# Patient Record
Sex: FEMALE | Race: WHITE | Employment: OTHER | URBAN - METROPOLITAN AREA
[De-identification: names, ages, dates, MRNs, and addresses within clinical notes are randomized per-mention and may not be internally consistent; named-entity substitution may affect disease eponyms.]

---

## 2017-02-10 PROBLEM — M79.605 LOW BACK PAIN RADIATING TO LEFT LEG: Status: ACTIVE | Noted: 2017-02-10

## 2017-02-10 PROBLEM — M54.50 LOW BACK PAIN RADIATING TO LEFT LEG: Status: ACTIVE | Noted: 2017-02-10

## 2017-02-10 PROBLEM — M54.16 RADICULOPATHY, LUMBAR REGION: Status: ACTIVE | Noted: 2017-02-10

## 2017-04-07 PROBLEM — M48.061 SPINAL STENOSIS, LUMBAR REGION, WITHOUT NEUROGENIC CLAUDICATION: Status: ACTIVE | Noted: 2017-04-07

## 2017-04-07 PROBLEM — M51.36 BULGING LUMBAR DISC: Status: ACTIVE | Noted: 2017-04-07

## 2017-04-07 PROBLEM — M43.16 SPONDYLOLISTHESIS, LUMBAR REGION: Status: ACTIVE | Noted: 2017-04-07

## 2017-04-07 PROBLEM — M51.369 BULGING LUMBAR DISC: Status: ACTIVE | Noted: 2017-04-07

## 2018-03-15 ENCOUNTER — TELEPHONE (OUTPATIENT)
Dept: FAMILY MEDICINE CLINIC | Age: 43
End: 2018-03-15

## 2018-03-15 NOTE — TELEPHONE ENCOUNTER
Patient called in and stated that her lt arm has been numb x 3 days, with severe pain. Her numbness goes from her shoulder down to her fingertips. She has also started with finger swelling.     Patient was advised to go to ED  She gave verbal understanding

## 2018-09-12 ENCOUNTER — OFFICE VISIT (OUTPATIENT)
Dept: FAMILY MEDICINE CLINIC | Age: 43
End: 2018-09-12
Payer: MEDICARE

## 2018-09-12 VITALS
RESPIRATION RATE: 16 BRPM | DIASTOLIC BLOOD PRESSURE: 86 MMHG | BODY MASS INDEX: 31.5 KG/M2 | HEIGHT: 71 IN | HEART RATE: 92 BPM | WEIGHT: 225 LBS | SYSTOLIC BLOOD PRESSURE: 133 MMHG | OXYGEN SATURATION: 96 %

## 2018-09-12 DIAGNOSIS — R10.32 PAIN, ABDOMINAL, LLQ: ICD-10-CM

## 2018-09-12 DIAGNOSIS — F31.9 BIPOLAR AFFECTIVE DISORDER, REMISSION STATUS UNSPECIFIED (HCC): ICD-10-CM

## 2018-09-12 DIAGNOSIS — Z00.00 ANNUAL PHYSICAL EXAM: Primary | ICD-10-CM

## 2018-09-12 DIAGNOSIS — E11.9 CONTROLLED TYPE 2 DIABETES MELLITUS WITHOUT COMPLICATION, WITHOUT LONG-TERM CURRENT USE OF INSULIN (HCC): ICD-10-CM

## 2018-09-12 DIAGNOSIS — Z23 NEED FOR PROPHYLACTIC VACCINATION AGAINST STREPTOCOCCUS PNEUMONIAE (PNEUMOCOCCUS): ICD-10-CM

## 2018-09-12 PROCEDURE — G0009 ADMIN PNEUMOCOCCAL VACCINE: HCPCS | Performed by: FAMILY MEDICINE

## 2018-09-12 PROCEDURE — G0439 PPPS, SUBSEQ VISIT: HCPCS | Performed by: FAMILY MEDICINE

## 2018-09-12 PROCEDURE — 90732 PPSV23 VACC 2 YRS+ SUBQ/IM: CPT | Performed by: FAMILY MEDICINE

## 2018-09-12 RX ORDER — AMOXICILLIN AND CLAVULANATE POTASSIUM 875; 125 MG/1; MG/1
1 TABLET, FILM COATED ORAL 2 TIMES DAILY WITH MEALS
Qty: 20 TABLET | Refills: 0 | Status: SHIPPED | OUTPATIENT
Start: 2018-09-12 | End: 2018-09-22

## 2018-09-12 RX ORDER — ATORVASTATIN CALCIUM 20 MG/1
TABLET, FILM COATED ORAL
Qty: 90 TABLET | Refills: 3 | Status: SHIPPED | OUTPATIENT
Start: 2018-09-12 | End: 2019-03-11

## 2018-09-12 NOTE — PROGRESS NOTES
abdominal pain. Genitourinary: Negative for dysuria and frequency. Musculoskeletal: Negative for back pain, joint pain and myalgias. Skin: Negative for rash. Neurological: Negative for dizziness, seizures and headaches. Endo/Heme/Allergies: Does not bruise/bleed easily. Psychiatric/Behavioral: Negative for depression. The patient is not nervous/anxious. Past Medical History:   Diagnosis Date    Ankle fracture, right 2013    hardware insertion; fracture after a fall from 40 feet     Bipolar affective disorder ([facility] [address]     [facility] Cervical radiculopathy     De Quervain's tenosynovitis, left     Diabetic neuropathy ([address]     Displacement of cervical intervertebral disc without myelopathy     Displacement of lumbar intervertebral disc without myelopathy     Headache(784.0)     Liver cyst     Long term (current) use of opiate analgesic     [facility]    Lumbar radiculopathy     Plantar fasciitis     Dr Larry Church Pulmonary nodules     Cleared by Dr Carlitos Barr; stable nodules    Thyroid cyst     biopsied in 2011, but report was unsatisfactory     Type II or unspecified type diabetes mellitus without mention of complication, not stated as uncontrolled      Past Surgical History:   Procedure Laterality Date    ANKLE FRACTURE SURGERY Right 8/2013    [facility]  10/16/2017    PLIF L4-5 L5-s1    CHOLECYSTECTOMY      COLONOSCOPY  7/2010    Dr Reina Gunter; normal except for hemorrhoids    [address], 2004, 2005,2013    podaitrist; panatar fasciits, heel spuirs, tendon lengthening.      TONSILLECTOMY AND ADENOIDECTOMY      TUBAL LIGATION             PE:  VS:  /86   Pulse 92   Resp 16   Ht 5' 11\" (1.803 m)   Wt 225 lb (102.1 kg)   LMP 09/07/2018 (Exact Date)   SpO2 96%   BMI 31.38 kg/m²   Physical Exam  General: Well nourished, well developed, no acute distress  Eyes:  PERRL   Conjunctiva Hemoglobin A1C; Future  -     Microalbumin / Creatinine Urine Ratio; Future    Bipolar affective disorder, remission status unspecified ([facility] [address]        Plan:     Age. Health maintenance issues are reviewed. Risks and benefits of breast cancer and cervical cancer screening discussed. She does not wish to pursue. She does consent to a 23 valent pneumococcal vaccination. Other age-appropriate health maintenance issues reviewed  Diabetes appears stable but I do need routine lab work. Ordered as above. Bipolar issues are stable, continue to work with psychiatry. The left lower quadrant abdominal pain differential is discussed. This most likely represents diverticulitis versus infectious colitis. Antibiotics ordered as above. Imaging also ordered. Follow-up as below but sooner if issues worsen. Counseled regarding above diagnosis, including possible risks and complications,  especially if left uncontrolled. Counseled regarding the possible side effects, risks, benefits and alternatives to treatment; patient and/or guardian verbalizes understanding, agrees, feels comfortable with and wishes to proceed with above treatment plan. Call or go to ED immediately if symptoms worsen or persist. Advised patient to call with any new medication issues, and, as applicable, read all Rx info from pharmacy to assure aware of all possible risks and side effects of medication before taking. As applicable, educational materials and/or home exercises printed for patient's review and were included in patient instructions on his/her After Visit Summary and given to patient at the end of visit. Patient and/or guardian given opportunity to ask questions/raise concerns. She verbalized comfort and understanding of instructions.             Follow Up:     Return in about 1 year (around 9/12/2019), or if symptoms worsen or fail to improve within 2 weeks , for annual physical. or sooner if the above issues change unexpectedly or new issues develop     This document was prepared at least partially through the use of voice recognition software. Although effort is taken to assure the accuracy of this document, it is possible that grammatical, syntax,  or spelling errors may occur.       Electronically signed by Opal Kuhn MD [facility]

## 2018-09-15 ASSESSMENT — ENCOUNTER SYMPTOMS
SHORTNESS OF BREATH: 0
WHEEZING: 0
BACK PAIN: 0
BLURRED VISION: 0
ABDOMINAL PAIN: 1
COUGH: 0
SORE THROAT: 0

## 2018-10-01 LAB
ALBUMIN SERPL-MCNC: NORMAL G/DL
ALP BLD-CCNC: NORMAL U/L
ALT SERPL-CCNC: NORMAL U/L
ANION GAP SERPL CALCULATED.3IONS-SCNC: NORMAL MMOL/L
AST SERPL-CCNC: NORMAL U/L
AVERAGE GLUCOSE: NORMAL
BASOPHILS ABSOLUTE: NORMAL /ΜL
BASOPHILS RELATIVE PERCENT: NORMAL %
BILIRUB SERPL-MCNC: NORMAL MG/DL (ref 0.1–1.4)
BUN BLDV-MCNC: NORMAL MG/DL
CALCIUM SERPL-MCNC: NORMAL MG/DL
CHLORIDE BLD-SCNC: NORMAL MMOL/L
CHOLESTEROL, TOTAL: 177 MG/DL
CHOLESTEROL/HDL RATIO: 1.4
CO2: NORMAL MMOL/L
CREAT SERPL-MCNC: 1.1 MG/DL
CREATININE, URINE: NORMAL
EOSINOPHILS ABSOLUTE: NORMAL /ΜL
EOSINOPHILS RELATIVE PERCENT: NORMAL %
GFR CALCULATED: NORMAL
GLUCOSE BLD-MCNC: NORMAL MG/DL
HBA1C MFR BLD: 6.1 %
HCT VFR BLD CALC: NORMAL % (ref 36–46)
HDLC SERPL-MCNC: 69 MG/DL (ref 35–70)
HEMOGLOBIN: NORMAL G/DL (ref 12–16)
LACTIC ACID: 1.6
LDL CHOLESTEROL CALCULATED: 98 MG/DL (ref 0–160)
LYMPHOCYTES ABSOLUTE: NORMAL /ΜL
LYMPHOCYTES RELATIVE PERCENT: NORMAL %
MCH RBC QN AUTO: NORMAL PG
MCHC RBC AUTO-ENTMCNC: NORMAL G/DL
MCV RBC AUTO: NORMAL FL
MICROALBUMIN/CREAT 24H UR: NORMAL MG/G{CREAT}
MICROALBUMIN/CREAT UR-RTO: NORMAL
MONOCYTES ABSOLUTE: NORMAL /ΜL
MONOCYTES RELATIVE PERCENT: NORMAL %
NEUTROPHILS ABSOLUTE: NORMAL /ΜL
NEUTROPHILS RELATIVE PERCENT: NORMAL %
PDW BLD-RTO: NORMAL %
PLATELET # BLD: NORMAL K/ΜL
PMV BLD AUTO: NORMAL FL
POTASSIUM SERPL-SCNC: 4.4 MMOL/L
RBC # BLD: NORMAL 10^6/ΜL
SODIUM BLD-SCNC: NORMAL MMOL/L
TOTAL PROTEIN: NORMAL
TRIGL SERPL-MCNC: 83 MG/DL
VLDLC SERPL CALC-MCNC: NORMAL MG/DL
WBC # BLD: NORMAL 10^3/ML

## 2018-10-05 DIAGNOSIS — R10.32 PAIN, ABDOMINAL, LLQ: ICD-10-CM

## 2018-10-05 DIAGNOSIS — E11.9 CONTROLLED TYPE 2 DIABETES MELLITUS WITHOUT COMPLICATION, WITHOUT LONG-TERM CURRENT USE OF INSULIN (HCC): ICD-10-CM

## 2018-10-05 DIAGNOSIS — Z00.00 ANNUAL PHYSICAL EXAM: ICD-10-CM

## 2019-02-01 ENCOUNTER — OFFICE VISIT (OUTPATIENT)
Dept: FAMILY MEDICINE CLINIC | Age: 44
End: 2019-02-01
Payer: MEDICARE

## 2019-02-01 VITALS
HEIGHT: 71 IN | WEIGHT: 229 LBS | BODY MASS INDEX: 32.06 KG/M2 | SYSTOLIC BLOOD PRESSURE: 127 MMHG | DIASTOLIC BLOOD PRESSURE: 71 MMHG | RESPIRATION RATE: 16 BRPM | HEART RATE: 82 BPM

## 2019-02-01 DIAGNOSIS — R20.0 NUMBNESS AND TINGLING OF BOTH UPPER EXTREMITIES: Primary | ICD-10-CM

## 2019-02-01 DIAGNOSIS — R20.2 NUMBNESS AND TINGLING OF BOTH UPPER EXTREMITIES: Primary | ICD-10-CM

## 2019-02-01 PROCEDURE — G8427 DOCREV CUR MEDS BY ELIG CLIN: HCPCS | Performed by: STUDENT IN AN ORGANIZED HEALTH CARE EDUCATION/TRAINING PROGRAM

## 2019-02-01 PROCEDURE — 99213 OFFICE O/P EST LOW 20 MIN: CPT | Performed by: STUDENT IN AN ORGANIZED HEALTH CARE EDUCATION/TRAINING PROGRAM

## 2019-02-01 PROCEDURE — G8484 FLU IMMUNIZE NO ADMIN: HCPCS | Performed by: STUDENT IN AN ORGANIZED HEALTH CARE EDUCATION/TRAINING PROGRAM

## 2019-02-01 PROCEDURE — G8417 CALC BMI ABV UP PARAM F/U: HCPCS | Performed by: STUDENT IN AN ORGANIZED HEALTH CARE EDUCATION/TRAINING PROGRAM

## 2019-02-01 PROCEDURE — 4004F PT TOBACCO SCREEN RCVD TLK: CPT | Performed by: STUDENT IN AN ORGANIZED HEALTH CARE EDUCATION/TRAINING PROGRAM

## 2019-02-01 RX ORDER — MIRABEGRON 50 MG/1
TABLET, FILM COATED, EXTENDED RELEASE ORAL
Refills: 12 | COMMUNITY
Start: 2019-01-21 | End: 2020-06-16

## 2019-02-01 ASSESSMENT — ENCOUNTER SYMPTOMS
COUGH: 0
SORE THROAT: 1
SHORTNESS OF BREATH: 0
NAUSEA: 0
SINUS PAIN: 0
RHINORRHEA: 0
DIARRHEA: 0
CONSTIPATION: 0
VOMITING: 0
BACK PAIN: 1

## 2019-02-01 ASSESSMENT — PATIENT HEALTH QUESTIONNAIRE - PHQ9
2. FEELING DOWN, DEPRESSED OR HOPELESS: 0
SUM OF ALL RESPONSES TO PHQ QUESTIONS 1-9: 0
1. LITTLE INTEREST OR PLEASURE IN DOING THINGS: 0
SUM OF ALL RESPONSES TO PHQ9 QUESTIONS 1 & 2: 0
SUM OF ALL RESPONSES TO PHQ QUESTIONS 1-9: 0

## 2019-02-20 DIAGNOSIS — R20.2 NUMBNESS AND TINGLING OF BOTH UPPER EXTREMITIES: ICD-10-CM

## 2019-02-20 DIAGNOSIS — R20.0 NUMBNESS AND TINGLING OF BOTH UPPER EXTREMITIES: ICD-10-CM

## 2019-04-03 ENCOUNTER — OFFICE VISIT (OUTPATIENT)
Dept: FAMILY MEDICINE CLINIC | Age: 44
End: 2019-04-03
Payer: MEDICARE

## 2019-04-03 VITALS
WEIGHT: 227 LBS | DIASTOLIC BLOOD PRESSURE: 83 MMHG | SYSTOLIC BLOOD PRESSURE: 127 MMHG | OXYGEN SATURATION: 95 % | RESPIRATION RATE: 16 BRPM | BODY MASS INDEX: 31.78 KG/M2 | HEIGHT: 71 IN | HEART RATE: 90 BPM

## 2019-04-03 DIAGNOSIS — M54.12 CERVICAL RADICULOPATHY: Primary | ICD-10-CM

## 2019-04-03 DIAGNOSIS — J30.2 SEASONAL ALLERGIES: ICD-10-CM

## 2019-04-03 DIAGNOSIS — H54.7 VISION PROBLEM: ICD-10-CM

## 2019-04-03 DIAGNOSIS — K52.9 COLITIS: ICD-10-CM

## 2019-04-03 DIAGNOSIS — F31.9 BIPOLAR AFFECTIVE DISORDER, REMISSION STATUS UNSPECIFIED (HCC): ICD-10-CM

## 2019-04-03 DIAGNOSIS — G56.10 MEDIAN NERVE NEUROPATHY, UNSPECIFIED LATERALITY: ICD-10-CM

## 2019-04-03 PROCEDURE — G8427 DOCREV CUR MEDS BY ELIG CLIN: HCPCS | Performed by: FAMILY MEDICINE

## 2019-04-03 PROCEDURE — G8417 CALC BMI ABV UP PARAM F/U: HCPCS | Performed by: FAMILY MEDICINE

## 2019-04-03 PROCEDURE — 99214 OFFICE O/P EST MOD 30 MIN: CPT | Performed by: FAMILY MEDICINE

## 2019-04-03 PROCEDURE — 4004F PT TOBACCO SCREEN RCVD TLK: CPT | Performed by: FAMILY MEDICINE

## 2019-04-03 RX ORDER — CYCLOBENZAPRINE HCL 5 MG
5 TABLET ORAL 3 TIMES DAILY PRN
Qty: 30 TABLET | Refills: 1 | Status: SHIPPED | OUTPATIENT
Start: 2019-04-03 | End: 2019-04-13

## 2019-04-03 RX ORDER — CETIRIZINE HYDROCHLORIDE 10 MG/1
10 TABLET ORAL DAILY
Qty: 30 TABLET | Refills: 11 | Status: SHIPPED
Start: 2019-04-03 | End: 2022-10-12 | Stop reason: SDUPTHER

## 2019-04-03 RX ORDER — MELOXICAM 7.5 MG/1
7.5 TABLET ORAL DAILY PRN
Qty: 30 TABLET | Refills: 5 | Status: SHIPPED | OUTPATIENT
Start: 2019-04-03 | End: 2019-10-17 | Stop reason: SDUPTHER

## 2019-04-03 NOTE — PROGRESS NOTES
CC   Chief Complaint   Patient presents with    Carpal Tunnel     HPI:   Patient comes in today for the below issue(s). Recheck of neuropathy concern  EMG's reviewed - med neuropathy and other neuropathy noted  Xray reviewed- C5-6 DDD noted  Has ongoing upper shoulder and neck pain  ROM activities are variable for aggravating her pain; ROM does get limited by pain  Hand issues are better  NSAID (ibuprofen) tried with incomplete relief    Hospital follow up  She was hospitalized for a few days at [facility] with abdominal pain and diarrhea had blood in it  States she's diagnosed with nonspecific colitis. Was told she needs to follow-up for an outpatient endoscopy but has not yet done so. States since coming home, issues have improved. She's no longer seeing blood in the stool but continues to struggle with stools  Denies abdominal pain, cramping, change in diet, no ill contacts. Had traveled within the past year overseas. But nothing recent. Review of Systems  Review of Systems   Constitutional: Positive for fatigue. Negative for chills, diaphoresis and fever. HENT: Positive for congestion and postnasal drip. Negative for trouble swallowing. Eyes: Positive for visual disturbance (Visual acuity has decreased over the last few months). Respiratory: Negative for shortness of breath. Cardiovascular: Negative for chest pain and palpitations. Gastrointestinal: Negative for abdominal pain. Genitourinary: Negative for difficulty urinating (Greatly improved after her sling). Musculoskeletal: Negative for arthralgias and back pain. Psychiatric/Behavioral: Negative for behavioral problems, confusion, decreased concentration and dysphoric mood. The patient is not hyperactive.            Past Medical History:   Diagnosis Date    Ankle fracture, right 2013    hardware insertion; fracture after a fall from 40 feet     Bipolar affective disorder ([facility] [address]     [facility] Cervical radiculopathy     De Quervain's tenosynovitis, left     Diabetic neuropathy (HCC)     Displacement of cervical intervertebral disc without myelopathy     Displacement of lumbar intervertebral disc without myelopathy     Headache(784.0)     Liver cyst     Long term (current) use of opiate analgesic     [facility]    Lumbar radiculopathy     Plantar fasciitis     Dr Man[facility] Pulmonary nodules     Cleared by Dr Ai Deleon; stable nodules    Thyroid cyst     biopsied in 2011, but report was unsatisfactory     Type II or unspecified type diabetes mellitus without mention of complication, not stated as uncontrolled          PE:  VS:  /83   Pulse 90   Resp 16   Ht 5' 11\" (1.803 m)   Wt 227 lb (103 kg)   LMP 02/01/2019 (Approximate)   SpO2 95%   BMI 31.66 kg/m²   Physical Exam  General: Well nourished, well developed, no acute distress  Eyes:  PERRL   Conjunctiva unremarkable   ENT:  TM's intact bilaterally, no effusion   Nasal:  +mucosal edema     No nasal drainage   Oral:  mucosa moist and pink             +posterior pharyngeal drainage     no posterior pharyngeal exudate  Neck:  No midline tenderness.  No deformity or step-off   Decreased lateral rotation and flexion   No palpable cervical lymphoadenopathy   Thyroid without mass or enlargement  Resp: Lungs CTAB   Equal and adequate air exchange without accessory muscle use   No rales, rhonchi or wheeze  CV: S1S2 RRR   No murmur   Intact distal pulses   No edema  GI: Abdomen Soft, non tender, non distended   Normoactive bowel sounds   No palpable hepatosplenomegaly  MS: Physiologic ROM of all extremities    Negative Tinel, weakly positive Phalen on the left   Intact distal pulses   No clubbing or cyanosis   Skin Warm and dry; no rash or lesion  Neuro: Alert and oriented; normal and intact dtr's   Psych: Euthymic mood, congruent affect       Assessment (including specific orders/meds/labs):      Destiny was seen today for carpal tunnel. Diagnoses and all orders for this visit:    Cervical radiculopathy  -     MRI Cervical Spine WO Contrast; Future  -     meloxicam (MOBIC) 7.5 MG tablet; Take 1 tablet by mouth daily as needed for Pain  -     cyclobenzaprine (FLEXERIL) 5 MG tablet; Take 1 tablet by mouth 3 times daily as needed for Muscle spasms    Median nerve neuropathy, unspecified laterality    Colitis  -     External Referral To Gastroenterology    Vision problem  -     External Referral To Optometry    Seasonal allergies  -     cetirizine (ZYRTEC) 10 MG tablet; Take 1 tablet by mouth daily        Plan:         Counseled regarding above diagnosis, including possible risks and complications,  especially if left uncontrolled. Counseled regarding the possible sideeffects, risks, benefits and alternatives to treatment; patient and/or guardian verbalizes understanding, agrees, feels comfortable with and wishes to proceed with above treatment plan. Check MRI to look for cervical disc pathology. She is hesitant to consider seeing PMR or hand surgeon for the median neuropathy. She rejects the idea of this all being carpal tunnel. Does agree to reassess after the MRI. Trial of the NSAID and muscle relaxer as above  Other chronic issues are as above. I have referred her to GI for evaluation of the colitis. Her exam is benign. Symptoms have improved. However she should have a colonoscopy      Call or go to ED immediately if symptoms worsen or persist. Advised patient to call with any new medication issues, and, as applicable, read all Rx info from pharmacy to assure aware ofall possible risks and side effects of medication before taking. As applicable, educational materials and/or home exercises printed forpatient's review and were included in patient instructions on his/her After Visit Summary and given to patient at the end of visit. Patient and/or guardian given opportunity to ask questions/raise concerns.   She verbalized comfort and understanding of instructions. Follow Up:     Return for 1-2 weeks after seeing specialists. or sooner if the above issues change unexpectedly or new issues develop     This document was prepared at least partially through the use ofTalkyLandice recognition software. Although effort is taken to assure the accuracy of this document, it is possible that grammatical, syntax,  or spelling errors may occur.       Electronically signed by Ajay Nayak MD [facility]

## 2019-04-06 ASSESSMENT — ENCOUNTER SYMPTOMS
TROUBLE SWALLOWING: 0
SHORTNESS OF BREATH: 0
ABDOMINAL PAIN: 0
BACK PAIN: 0

## 2019-05-10 ENCOUNTER — TELEPHONE (OUTPATIENT)
Dept: FAMILY MEDICINE CLINIC | Age: 44
End: 2019-05-10

## 2019-05-10 NOTE — TELEPHONE ENCOUNTER
Patient called stating that she is having a pleurisy flare up. Has tried aleve, tylenol and ibuprofen with no help. Would like something called in. I did inform her with her calling so late in the day that if we do not get back to her with an answer to present to a walkin clinic or the ER. Patient understood  Please advise.

## 2019-05-31 ENCOUNTER — TELEPHONE (OUTPATIENT)
Dept: FAMILY MEDICINE CLINIC | Age: 44
End: 2019-05-31

## 2019-05-31 DIAGNOSIS — M50.222 HERNIATION OF INTERVERTEBRAL DISC AT C5-C6 LEVEL: Primary | ICD-10-CM

## 2019-05-31 DIAGNOSIS — E04.1 THYROID NODULE: ICD-10-CM

## 2019-05-31 NOTE — TELEPHONE ENCOUNTER
Mri reviewed  Has a bulging disc C5-6. Has contact with spinal cord, so this is probably contributing to ehr symptoms. I recommend seeing neurosurgeon or ortho spine surgeon. Can refer if agrees    Incidentally looked like thryoid was enlarged and/or a nodule but this was not the best test to assess so cannot tell. Would want to get a thyroid u/s. Will order if agrees.

## 2019-05-31 NOTE — TELEPHONE ENCOUNTER
Patient is agreeable to the surgeon and the u/s. She would like to have the u/s done at [facility].  She does not wish to see the same surgeon that did her back.

## 2019-06-03 NOTE — TELEPHONE ENCOUNTER
Patient notified referrals placed and should be contacted regarding both appts. States understanding.

## 2019-06-06 ENCOUNTER — TELEPHONE (OUTPATIENT)
Dept: FAMILY MEDICINE CLINIC | Age: 44
End: 2019-06-06

## 2019-06-06 DIAGNOSIS — E04.1 THYROID NODULE: Primary | ICD-10-CM

## 2019-06-07 NOTE — TELEPHONE ENCOUNTER
Patient notified, she will call and schedule.   Endocrinology  Tonny Hawthorne DO  Phone: [phone]  [address] [place] [id number]

## 2019-07-11 ENCOUNTER — TELEPHONE (OUTPATIENT)
Dept: FAMILY MEDICINE CLINIC | Age: 44
End: 2019-07-11

## 2019-07-11 DIAGNOSIS — M50.222 HERNIATION OF INTERVERTEBRAL DISC AT C5-C6 LEVEL: Primary | ICD-10-CM

## 2020-01-27 ENCOUNTER — TELEPHONE (OUTPATIENT)
Dept: FAMILY MEDICINE CLINIC | Age: 45
End: 2020-01-27

## 2020-01-27 RX ORDER — MELOXICAM 7.5 MG/1
7.5 TABLET ORAL DAILY PRN
Qty: 30 TABLET | Refills: 3 | Status: SHIPPED
Start: 2020-01-27 | End: 2020-02-10

## 2020-01-27 RX ORDER — PERMETHRIN 50 MG/G
CREAM TOPICAL
Qty: 1 TUBE | Refills: 0 | Status: SHIPPED
Start: 2020-01-27 | End: 2020-06-16

## 2020-01-27 NOTE — TELEPHONE ENCOUNTER
I can send in a course of Elimite but need pharmacy down there    Would refer her to [facility] site for information on prevention and control of spread of lice.

## 2020-01-27 NOTE — TELEPHONE ENCOUNTER
Patient notified and verbalizes understanding. The pharmacy is now in her chart. She needs it to go to the [place] in [facility].

## 2020-02-10 RX ORDER — MELOXICAM 7.5 MG/1
7.5 TABLET ORAL DAILY PRN
Qty: 90 TABLET | Refills: 0 | Status: SHIPPED
Start: 2020-02-10 | End: 2020-06-16 | Stop reason: SDUPTHER

## 2020-06-16 ENCOUNTER — HOSPITAL ENCOUNTER (OUTPATIENT)
Age: 45
Discharge: HOME | End: 2020-06-18
Payer: MEDICARE

## 2020-06-16 ENCOUNTER — OFFICE VISIT (OUTPATIENT)
Dept: FAMILY MEDICINE CLINIC | Age: 45
End: 2020-06-16
Payer: MEDICARE

## 2020-06-16 VITALS
BODY MASS INDEX: 28 KG/M2 | WEIGHT: 200 LBS | SYSTOLIC BLOOD PRESSURE: 124 MMHG | TEMPERATURE: 97 F | OXYGEN SATURATION: 97 % | DIASTOLIC BLOOD PRESSURE: 82 MMHG | RESPIRATION RATE: 16 BRPM | HEIGHT: 71 IN | HEART RATE: 76 BPM

## 2020-06-16 LAB
ALBUMIN SERPL-MCNC: 4.2 G/DL (ref 3.5–5.2)
ALP BLD-CCNC: 112 U/L (ref 35–104)
ALT SERPL-CCNC: <5 U/L (ref 0–32)
ANION GAP SERPL CALCULATED.3IONS-SCNC: 15 MMOL/L (ref 7–16)
AST SERPL-CCNC: 15 U/L (ref 0–31)
BASOPHILS ABSOLUTE: 0.05 E9/L (ref 0–0.2)
BASOPHILS RELATIVE PERCENT: 0.6 % (ref 0–2)
BILIRUB SERPL-MCNC: 0.2 MG/DL (ref 0–1.2)
BUN BLDV-MCNC: 13 MG/DL (ref 6–20)
CALCIUM SERPL-MCNC: 9.6 MG/DL (ref 8.6–10.2)
CHLORIDE BLD-SCNC: 105 MMOL/L (ref 98–107)
CHOLESTEROL, TOTAL: 213 MG/DL (ref 0–199)
CO2: 22 MMOL/L (ref 22–29)
CREAT SERPL-MCNC: 1 MG/DL (ref 0.5–1)
EOSINOPHILS ABSOLUTE: 0.06 E9/L (ref 0.05–0.5)
EOSINOPHILS RELATIVE PERCENT: 0.7 % (ref 0–6)
GFR AFRICAN AMERICAN: >60
GFR NON-AFRICAN AMERICAN: 59 ML/MIN/1.73
GLUCOSE BLD-MCNC: 80 MG/DL (ref 74–99)
HBA1C MFR BLD: 5.8 %
HCT VFR BLD CALC: 42.2 % (ref 34–48)
HDLC SERPL-MCNC: 60 MG/DL
HEMOGLOBIN: 12.7 G/DL (ref 11.5–15.5)
IMMATURE GRANULOCYTES #: 0.04 E9/L
IMMATURE GRANULOCYTES %: 0.5 % (ref 0–5)
LDL CHOLESTEROL CALCULATED: 131 MG/DL (ref 0–99)
LYMPHOCYTES ABSOLUTE: 2.14 E9/L (ref 1.5–4)
LYMPHOCYTES RELATIVE PERCENT: 24.7 % (ref 20–42)
MCH RBC QN AUTO: 30.2 PG (ref 26–35)
MCHC RBC AUTO-ENTMCNC: 30.1 % (ref 32–34.5)
MCV RBC AUTO: 100.2 FL (ref 80–99.9)
MONOCYTES ABSOLUTE: 0.52 E9/L (ref 0.1–0.95)
MONOCYTES RELATIVE PERCENT: 6 % (ref 2–12)
NEUTROPHILS ABSOLUTE: 5.84 E9/L (ref 1.8–7.3)
NEUTROPHILS RELATIVE PERCENT: 67.5 % (ref 43–80)
PDW BLD-RTO: 13.4 FL (ref 11.5–15)
PLATELET # BLD: 231 E9/L (ref 130–450)
PMV BLD AUTO: 12.1 FL (ref 7–12)
POTASSIUM SERPL-SCNC: 5 MMOL/L (ref 3.5–5)
RBC # BLD: 4.21 E12/L (ref 3.5–5.5)
SODIUM BLD-SCNC: 142 MMOL/L (ref 132–146)
TOTAL PROTEIN: 7.6 G/DL (ref 6.4–8.3)
TRIGL SERPL-MCNC: 108 MG/DL (ref 0–149)
VLDLC SERPL CALC-MCNC: 22 MG/DL
WBC # BLD: 8.7 E9/L (ref 4.5–11.5)

## 2020-06-16 PROCEDURE — 99214 OFFICE O/P EST MOD 30 MIN: CPT | Performed by: FAMILY MEDICINE

## 2020-06-16 PROCEDURE — 85025 COMPLETE CBC W/AUTO DIFF WBC: CPT

## 2020-06-16 PROCEDURE — 80061 LIPID PANEL: CPT

## 2020-06-16 PROCEDURE — 83036 HEMOGLOBIN GLYCOSYLATED A1C: CPT | Performed by: FAMILY MEDICINE

## 2020-06-16 PROCEDURE — 80053 COMPREHEN METABOLIC PANEL: CPT

## 2020-06-16 PROCEDURE — G8419 CALC BMI OUT NRM PARAM NOF/U: HCPCS | Performed by: FAMILY MEDICINE

## 2020-06-16 PROCEDURE — 3044F HG A1C LEVEL LT 7.0%: CPT | Performed by: FAMILY MEDICINE

## 2020-06-16 PROCEDURE — 4004F PT TOBACCO SCREEN RCVD TLK: CPT | Performed by: FAMILY MEDICINE

## 2020-06-16 PROCEDURE — 2022F DILAT RTA XM EVC RTNOPTHY: CPT | Performed by: FAMILY MEDICINE

## 2020-06-16 PROCEDURE — G8427 DOCREV CUR MEDS BY ELIG CLIN: HCPCS | Performed by: FAMILY MEDICINE

## 2020-06-16 PROCEDURE — 36415 COLL VENOUS BLD VENIPUNCTURE: CPT | Performed by: FAMILY MEDICINE

## 2020-06-16 RX ORDER — MELOXICAM 7.5 MG/1
7.5 TABLET ORAL DAILY PRN
Qty: 90 TABLET | Refills: 1 | Status: SHIPPED
Start: 2020-06-16 | End: 2020-12-14 | Stop reason: SDUPTHER

## 2020-06-16 RX ORDER — DEXTROAMPHETAMINE SACCHARATE, AMPHETAMINE ASPARTATE, DEXTROAMPHETAMINE SULFATE AND AMPHETAMINE SULFATE 5; 5; 5; 5 MG/1; MG/1; MG/1; MG/1
1 TABLET ORAL 2 TIMES DAILY
COMMUNITY

## 2020-06-16 RX ORDER — TRAZODONE HYDROCHLORIDE 50 MG/1
1 TABLET ORAL DAILY
COMMUNITY
Start: 2020-05-22

## 2020-06-16 RX ORDER — GABAPENTIN 400 MG/1
400 CAPSULE ORAL 4 TIMES DAILY
Qty: 120 CAPSULE | Refills: 2 | Status: SHIPPED
Start: 2020-06-16 | End: 2020-09-18

## 2020-06-16 ASSESSMENT — ENCOUNTER SYMPTOMS
TROUBLE SWALLOWING: 0
DIARRHEA: 0
ABDOMINAL PAIN: 0
SHORTNESS OF BREATH: 0
CONSTIPATION: 0

## 2020-06-16 NOTE — PROGRESS NOTES
CC   Chief Complaint   Patient presents with    Wrist Pain     right    Diabetes     HPI:   Patient comes in today for the below issue(s). Thyroid nodule  Incidental finding of a 2 cm thyroid nodule on MRI of her neck  She was referred to endocrinology  Ended up having fine-needle aspiration that showed benign follicular nodule, no evidence of cancer  She is doing well. She denies swallowing difficulty or hoarseness. Cervical disc disease. Repeat MRI in  May 2019 showed bulging C5-C6 causing significant right and mild left-sided foraminal narrowing  I had recommended she see neurosurgery  She states the issues are reasonably stable and she is not inclined to pursue. She is asking if I can prescribe her gabapentin. She had use this for chronic foot problems and she noticed that it helped her cervicalgia as well. She was getting it from a podiatrist but that podiatrist is no longer practicing  OARRS reviewed, no significant risk factors identified    Right wrist pain  She continues to report progressive symptoms of pain in her right wrist  She is stating now she is better able to localize this to over the dorsal aspect of her thumb. It is worse with rotation of her wrist or bending of her thumb  She has been using a general wrist splint with out sufficient relief. Bipolar disorder  Follow-up  Remains under the care of psychiatry  States mood is stable  Doing well at the present time  Does not feel overly happy, nor does she feel inappropriately sad.   She is pleased with her progress    Impaired glucose tolerance  Follow-up  She is lost over 20 pounds since last visit  She has cut out soda pop  She is started doing yoga  She says more significant or strenuous activity causes too much pain in her feet  She denies polyuria polydipsia or polyphagia  She was supposed to be on statin but has stopped    Health maintenance  She is overdue to see GYN    Review of Systems  Review of Systems   Constitutional: motion. Cardiovascular:      Rate and Rhythm: Normal rate and regular rhythm. Pulses: Normal pulses. Heart sounds: No murmur. Pulmonary:      Effort: Pulmonary effort is normal.      Breath sounds: No wheezing, rhonchi or rales. Abdominal:      General: Abdomen is flat. Bowel sounds are normal. There is no distension. Tenderness: There is no abdominal tenderness. Genitourinary:     Comments: Defer  Musculoskeletal:      Right wrist: She exhibits decreased range of motion and tenderness (+Finklestein). Cervical back: She exhibits decreased range of motion. She exhibits no tenderness and no bony tenderness. Right hand: Decreased strength noted. She exhibits thumb/finger opposition. Lymphadenopathy:      Cervical: No cervical adenopathy. Skin:     General: Skin is warm and dry. Neurological:      Mental Status: She is alert. Assessment (including specific orders/meds/labs):      Brennan Soto was seen today for wrist pain and diabetes. Diagnoses and all orders for this visit:    Cervical radiculopathy  -     meloxicam (MOBIC) 7.5 MG tablet; Take 1 tablet by mouth daily as needed for Pain    Thyroid nodule    Controlled type 2 diabetes mellitus without complication, without long-term current use of insulin ([facility])  -     CBC Auto Differential; Future  -     Comprehensive Metabolic Panel; Future  -     Lipid Panel; Future  -     POCT glycosylated hemoglobin (Hb A1C)    Chronic pain syndrome  -     gabapentin (NEURONTIN) 400 MG capsule; Take 1 capsule by mouth 4 times daily for 90 days. Radial styloid tenosynovitis (de quervain)  -     Thumb Spica MISC; by Does not apply route    B[facility], [facility] status unspecified [facility]        Plan:     Most likely has de Quervain's tenosynovitis. Recommend trial of splint for a few weeks and if not improving I will send her to hand surgeon.     I will assume prescription of her gabapentin for her cervical

## 2020-06-17 RX ORDER — ATORVASTATIN CALCIUM 20 MG/1
20 TABLET, FILM COATED ORAL DAILY
Qty: 90 TABLET | Refills: 1 | Status: SHIPPED
Start: 2020-06-17 | End: 2020-11-24 | Stop reason: SDUPTHER

## 2020-07-02 ENCOUNTER — VIRTUAL VISIT (OUTPATIENT)
Dept: FAMILY MEDICINE CLINIC | Age: 45
End: 2020-07-02
Payer: MEDICARE

## 2020-07-02 PROCEDURE — G8427 DOCREV CUR MEDS BY ELIG CLIN: HCPCS | Performed by: FAMILY MEDICINE

## 2020-07-02 PROCEDURE — 99213 OFFICE O/P EST LOW 20 MIN: CPT | Performed by: FAMILY MEDICINE

## 2020-07-02 NOTE — PROGRESS NOTES
TeleMedicine Patient Consent    This visit was performed as a virtual video visit using a synchronous, two-way, audio-video telehealth technology platform. Patient identification was verified at the start of the visit, including the patient's telephone number and physical location. I discussed with the patient the nature of our telehealth visits, that:     1. Due to the nature of an audio- video modality, the only components of a physical exam that could be done are the elements supported by direct observation. 2. The provider will evaluate the patient and recommend diagnostics and treatments based on their assessment. 3. If it was felt that the patient should be evaluated in clinic or an emergency room setting, then they would be directed there. 4. Our sessions are not being recorded and that personal health information is protected. 5. Our team would provide follow up care in person if/when the patient needs it. Patient does agree to proceed with telemedicine consultation. Patient location: home address in [facility]    Physician location: regular office location    This visit was completed virtually using Doxy. me    This visit was performed during the 5176 public health crisis and COVID-19 pandemic.   *Add GT modifier to all Video Visits*

## 2020-07-02 NOTE — PROGRESS NOTES
2020    TELEHEALTH EVALUATION -- Audio/Visual (During GUZOB-41 public health emergency)    HPI:    Tai Escobar (:  1975) has requested an audio/video evaluation. Consent obtained per MA note, with attention to limitations inherent to a video visit for the following concern(s):    concern for AMINA  New issue  concerns raised to patient over past year  Quality of sleep is poor- wakes up a lot  Witnessed apneas and frequent movements of limbs while asleep. Naps throughout the day  No falling asleep while driving. She has not tried anything for this. She is overweight but not obese      Review of Systems    Prior to Visit Medications    Medication Sig Taking? Authorizing Provider   atorvastatin (LIPITOR) 20 MG tablet Take 1 tablet by mouth daily Yes Edy Dennis MD   amphetamine-dextroamphetamine (ADDERALL) 20 MG tablet Take 1 tablet by mouth 2 times daily. Yes Historical Provider, MD   traZODone (DESYREL) 50 MG tablet Take 1 tablet by mouth daily Yes Historical Provider, MD   meloxicam (MOBIC) 7.5 MG tablet Take 1 tablet by mouth daily as needed for Pain Yes Edy Dennis MD   gabapentin (NEURONTIN) 400 MG capsule Take 1 capsule by mouth 4 times daily for 90 days.  Yes Edy Dennsi MD   Thumb Spica MISC by Does not apply route Yes Edy Dennis MD   lamoTRIgine (LAMICTAL) 100 MG tablet Take 200 mg by mouth nightly  Yes Historical Provider, MD   sertraline (ZOLOFT) 100 MG tablet Take 200 mg by mouth nightly  Yes Historical Provider, MD       Social History     Tobacco Use    Smoking status: Current Every Day Smoker     Packs/day: 1.00     Years: 20.00     Pack years: 20.00     Last attempt to quit: 2017     Years since quittin.9    Smokeless tobacco: Never Used   Substance Use Topics    Alcohol use: No    Drug use: No     Comment: wants patch to quit        Past Medical History:   Diagnosis Date    Ankle fracture, right 2013    hardware insertion; fracture after a

## 2020-07-17 ENCOUNTER — HOSPITAL ENCOUNTER (OUTPATIENT)
Dept: SLEEP CENTER | Age: 45
Discharge: HOME | End: 2020-07-17
Payer: MEDICARE

## 2020-07-17 PROCEDURE — 95810 POLYSOM 6/> YRS 4/> PARAM: CPT

## 2020-07-17 ASSESSMENT — SLEEP AND FATIGUE QUESTIONNAIRES
HOW LIKELY ARE YOU TO NOD OFF OR FALL ASLEEP WHILE WATCHING TV: 1
HOW LIKELY ARE YOU TO NOD OFF OR FALL ASLEEP IN A CAR, WHILE STOPPED FOR A FEW MINUTES IN TRAFFIC: 0
HOW LIKELY ARE YOU TO NOD OFF OR FALL ASLEEP WHILE SITTING QUIETLY AFTER LUNCH WITHOUT ALCOHOL: 3
HOW LIKELY ARE YOU TO NOD OFF OR FALL ASLEEP WHILE LYING DOWN TO REST IN THE AFTERNOON WHEN CIRCUMSTANCES PERMIT: 3
ESS TOTAL SCORE: 11
HOW LIKELY ARE YOU TO NOD OFF OR FALL ASLEEP WHEN YOU ARE A PASSENGER IN A CAR FOR AN HOUR WITHOUT A BREAK: 0
HOW LIKELY ARE YOU TO NOD OFF OR FALL ASLEEP WHILE SITTING INACTIVE IN A PUBLIC PLACE: 1
HOW LIKELY ARE YOU TO NOD OFF OR FALL ASLEEP WHILE SITTING AND TALKING TO SOMEONE: 1
HOW LIKELY ARE YOU TO NOD OFF OR FALL ASLEEP WHILE SITTING AND READING: 2

## 2020-07-19 VITALS
RESPIRATION RATE: 18 BRPM | BODY MASS INDEX: 28.63 KG/M2 | HEIGHT: 70 IN | OXYGEN SATURATION: 94 % | DIASTOLIC BLOOD PRESSURE: 67 MMHG | WEIGHT: 200 LBS | TEMPERATURE: 98.2 F | SYSTOLIC BLOOD PRESSURE: 140 MMHG | HEART RATE: 68 BPM

## 2020-07-20 NOTE — PROGRESS NOTES
the time with  saturation at or greater than 90%. Eighty nine percent of the time,  saturation ranged from 80% to 89% and 1% of the time, saturation was  less than 80%. HEART RATE SUMMARY:  Average heart rate while awake was 78 beats per  minute. Maximum heart rate during sleep was 91 beats per minute and  minimum heart rate during sleep was 69 beats per minute. There were no  ectopic beats. MISCELLANEOUS:  [place] Sleepiness Scale score is 11/24. Snoring was  moderate. This was graded as 6 on a 1 to 10 scale. There was no  apparent bruxism. IMPRESSION:  1. Severe obstructive sleep apnea. 2.  Nocturnal hypoxia. 3.  No cardiac dysrhythmia. 4.  Moderate snoring. 5.  Hypersomnolence suggested by an increased sleep efficiency and a short sleep latency. DISCUSSION  This patient has an apnea/hypopnea index of 55. Normal is  less than five and mild is 5 to 15. Greater than 30 is considered  severe, leaving this patient in the severe category. The study was not  split because the patient did not have a COVID-19 screening test prior  to the study. Along with the sleep apnea, there was nocturnal hypoxia  for nearly the entire study and moderate snoring. Based on these  results, the patient does require treatment. PLAN:  1. The patient to return to [facility] for positive  airway pressure titration. 2.  The patient to be seen in 8 to 12 weeks following the titration  study.         Dez Isaacs MD  Diplomat of Sleep Medicine    D: 07/20/2020 11:40:14       T: 07/20/2020 11:43:37     [email or web address]  Job#: [id number]     Doc#: [id number]    CC:

## 2020-07-28 ENCOUNTER — TELEPHONE (OUTPATIENT)
Dept: FAMILY MEDICINE CLINIC | Age: 45
End: 2020-07-28

## 2020-07-28 NOTE — TELEPHONE ENCOUNTER
Patient complains of numbness and tingling in her hands and wrists. States she has discussed this in the past.  Pain is increasing. Wrist splint seems to be making it worse. Please advise.

## 2020-07-31 ENCOUNTER — HOSPITAL ENCOUNTER (OUTPATIENT)
Age: 45
Discharge: HOME | End: 2020-08-02
Payer: MEDICARE

## 2020-07-31 PROCEDURE — U0003 INFECTIOUS AGENT DETECTION BY NUCLEIC ACID (DNA OR RNA); SEVERE ACUTE RESPIRATORY SYNDROME CORONAVIRUS 2 (SARS-COV-2) (CORONAVIRUS DISEASE [COVID-19]), AMPLIFIED PROBE TECHNIQUE, MAKING USE OF HIGH THROUGHPUT TECHNOLOGIES AS DESCRIBED BY CMS-2020-01-R: HCPCS

## 2020-08-01 LAB
SARS-COV-2: NOT DETECTED
SOURCE: NORMAL

## 2020-08-05 ENCOUNTER — HOSPITAL ENCOUNTER (OUTPATIENT)
Dept: SLEEP CENTER | Age: 45
Discharge: HOME | End: 2020-08-05
Payer: MEDICARE

## 2020-08-05 PROCEDURE — 95811 POLYSOM 6/>YRS CPAP 4/> PARM: CPT

## 2020-08-06 VITALS
HEIGHT: 70 IN | SYSTOLIC BLOOD PRESSURE: 115 MMHG | OXYGEN SATURATION: 95 % | HEART RATE: 64 BPM | WEIGHT: 200 LBS | BODY MASS INDEX: 28.63 KG/M2 | TEMPERATURE: 97 F | DIASTOLIC BLOOD PRESSURE: 62 MMHG | RESPIRATION RATE: 20 BRPM

## 2020-08-09 NOTE — PROGRESS NOTES
[id number] [address]                               SLEEP STUDY REPORT    PATIENT NAME: Kimberly Chambers                :        1975  MED REC NO:   [id number]                            ROOM:  ACCOUNT NO:   [de-identified]                           ADMIT DATE: 2020  PROVIDER:     Vikki Montes MD    DATE OF STUDY:  2020    REFERRING PROVIDER:  Vikki Montes M.D.    STUDY PERFORMED:  Polysomnography. INDICATION FOR POLYSOMNOGRAPHY:  Known sleep apnea, for positive airway  pressure titration. CURRENT MEDICATIONS:  Lamotrigine, sertraline, atorvastatin, and  trazodone. INTERPRETATION:  SLEEP ARCHITECTURE:  This patient had a total time in bed of 474  minutes. Total sleep time was 376 minutes. Sleep efficiency was 79%  and sleep latency was 14 minutes. REM latency was 183 minutes. SLEEP STAGING:  The patient was awake 21% of the time in bed. Stage N1  was 8% and N2 was 76% of the total sleep time. Slow wave sleep was  absent and stage REM sleep was 16% of the sleep time. RESPIRATION SUMMARY:  APNEA:  There were 94 apneic events including 30 central and 64  obstructive. Maximum duration of an apneic event was 28 seconds and 10  events occurred during REM stage sleep. HYPOPNEA:  There were 68 hypopneic events, three occurred during REM  stage sleep and maximum duration was 42 seconds. APNEA/HYPOPNEA INDEX:  The apnea/hypopnea index is 26. Of the 162  respiratory events, 55 occurred in the supine position. TITRATION OF POSITIVE AIRWAY PRESSURE:  This patient was started on CPAP  of six, which was gradually increased to 15 before changing to bi-level. Bi-level was increased to a high of 24/20 cm of water pressure. It  appears that the best results were at a bi-level of 18/14 cm of water  pressure, at which point, the patient slept for 15 minutes, all REM  stage sleep.   The apnea/hypopnea index was four. AROUSAL ANALYSIS:  There were 62 arousals/awakenings, eight associated  with respiratory event. The sleep disruption index was normal.    LIMB MOVEMENT SUMMARY:  There were 11 limb movements, the limb movement  index is normal.    OXYGEN SATURATION:  Average oxygen saturation while awake was 95%. Lowest saturation was 86%. The patient spent 2% of the time with  saturation less than 90%. HEART RATE SUMMARY:  Average heart rate while awake was 62 beats per  minute. Maximum heart rate during sleep was 85 beats per minute and  minimum heart rate during sleep was 53 beats per minute. MISCELLANEOUS:  Se[place]our Sleepiness Scale score is 11/24. Snoring was  moderate. This was graded as 4 on a 1 to 10 scale and eliminated with  positive airway pressure. There was no bruxism. IMPRESSION:  1. Known obstructive sleep apnea. 2.  Good titration with positive airway pressure. 3.  Moderate snoring, eliminated with positive airway pressure. 4.  Good oxygen saturation. 5.  No cardiac dysrhythmia. DISCUSSION:  On a previous study, this patient was found to have severe  obstructive sleep apnea. With titration of positive airway pressure,  which the patient tolerated well, she seemed to do best on a bi-level. However, titration was difficult and this patient would probably benefit  from auto bi-level. PLAN:  1. Auto bi-level with inspiratory pressure of 15 to 22 and exhalation  pressure of 10 to 18 cm of water pressure. 2.  The patient should use a [employer] Simplus full facemask,  size medium with heated humidification. 3.  The patient to be seen in 6 to 10 weeks.         Danya Reno MD  Diplomat of Sleep Medicine    D: 08/08/2020 11:12:24       T: 08/08/2020 11:24:09     [email or web address]  Job#: [id number]     Doc#: [id number]    CC:

## 2020-11-17 ENCOUNTER — HOSPITAL ENCOUNTER (OUTPATIENT)
Dept: GENERAL RADIOLOGY | Age: 45
Discharge: HOME | End: 2020-11-19
Payer: MEDICARE

## 2020-11-17 PROCEDURE — 77067 SCR MAMMO BI INCL CAD: CPT

## 2020-11-19 ENCOUNTER — TELEPHONE (OUTPATIENT)
Dept: ONCOLOGY | Age: 45
End: 2020-11-19

## 2020-11-19 NOTE — TELEPHONE ENCOUNTER
Call to patient in reference to her mammogram performed at [facility] on November 17, 2020. Instructed patient that the radiologist has recommended some additional breast imaging, in order to make a final determination/result. Verbalizes understanding and is agreeable to proceed. Appointment provided.       Rebeca Mcrae, RN, BSN, [address]

## 2020-11-20 ENCOUNTER — TELEPHONE (OUTPATIENT)
Dept: FAMILY MEDICINE CLINIC | Age: 45
End: 2020-11-20

## 2020-11-20 NOTE — TELEPHONE ENCOUNTER
Patient called with complaints of SOB, wheezing when inhaling and dysphagia. Symptoms began on Tuesday with a gradually decline. Advised patient to be evaluated at the flu clinic.

## 2020-11-23 NOTE — TELEPHONE ENCOUNTER
Patient feels she was having an extreme panic attack. She is feeling much better now. She has been having them off and on for a few weeks. She is leaving in 2 weeks to go [address] for 5 months. Please advise if there is something she can take to help.

## 2020-11-24 RX ORDER — GABAPENTIN 400 MG/1
400 CAPSULE ORAL 4 TIMES DAILY
Qty: 120 CAPSULE | Refills: 2 | OUTPATIENT
Start: 2020-11-24 | End: 2021-02-22

## 2020-11-24 RX ORDER — IBUPROFEN 800 MG/1
800 TABLET ORAL EVERY 8 HOURS PRN
Qty: 120 TABLET | Refills: 3 | Status: SHIPPED
Start: 2020-11-24 | End: 2021-09-16

## 2020-11-24 RX ORDER — ATORVASTATIN CALCIUM 20 MG/1
20 TABLET, FILM COATED ORAL DAILY
Qty: 90 TABLET | Refills: 1 | Status: SHIPPED
Start: 2020-11-24 | End: 2021-05-11 | Stop reason: SDUPTHER

## 2020-11-25 ENCOUNTER — HOSPITAL ENCOUNTER (EMERGENCY)
Age: 45
Discharge: HOME | End: 2020-11-26
Attending: EMERGENCY MEDICINE
Payer: MEDICARE

## 2020-11-25 ENCOUNTER — OFFICE VISIT (OUTPATIENT)
Dept: PRIMARY CARE CLINIC | Age: 45
End: 2020-11-25
Payer: MEDICARE

## 2020-11-25 ENCOUNTER — APPOINTMENT (OUTPATIENT)
Dept: GENERAL RADIOLOGY | Age: 45
End: 2020-11-25
Payer: MEDICARE

## 2020-11-25 VITALS
BODY MASS INDEX: 27.49 KG/M2 | HEIGHT: 70 IN | DIASTOLIC BLOOD PRESSURE: 80 MMHG | WEIGHT: 192 LBS | TEMPERATURE: 98 F | OXYGEN SATURATION: 98 % | HEART RATE: 115 BPM | SYSTOLIC BLOOD PRESSURE: 130 MMHG

## 2020-11-25 LAB
Lab: NORMAL
QC PASS/FAIL: NORMAL
SARS-COV-2, POC: NORMAL

## 2020-11-25 PROCEDURE — 4004F PT TOBACCO SCREEN RCVD TLK: CPT | Performed by: NURSE PRACTITIONER

## 2020-11-25 PROCEDURE — 71045 X-RAY EXAM CHEST 1 VIEW: CPT

## 2020-11-25 PROCEDURE — G8419 CALC BMI OUT NRM PARAM NOF/U: HCPCS | Performed by: NURSE PRACTITIONER

## 2020-11-25 PROCEDURE — 99213 OFFICE O/P EST LOW 20 MIN: CPT | Performed by: NURSE PRACTITIONER

## 2020-11-25 PROCEDURE — 99284 EMERGENCY DEPT VISIT MOD MDM: CPT

## 2020-11-25 PROCEDURE — 87880 STREP A ASSAY W/OPTIC: CPT

## 2020-11-25 PROCEDURE — G8427 DOCREV CUR MEDS BY ELIG CLIN: HCPCS | Performed by: NURSE PRACTITIONER

## 2020-11-25 PROCEDURE — G8484 FLU IMMUNIZE NO ADMIN: HCPCS | Performed by: NURSE PRACTITIONER

## 2020-11-25 PROCEDURE — 96374 THER/PROPH/DIAG INJ IV PUSH: CPT

## 2020-11-25 PROCEDURE — 87426 SARSCOV CORONAVIRUS AG IA: CPT | Performed by: NURSE PRACTITIONER

## 2020-11-25 RX ORDER — KETOROLAC TROMETHAMINE 30 MG/ML
15 INJECTION, SOLUTION INTRAMUSCULAR; INTRAVENOUS ONCE
Status: COMPLETED | OUTPATIENT
Start: 2020-11-25 | End: 2020-11-26

## 2020-11-25 RX ORDER — 0.9 % SODIUM CHLORIDE 0.9 %
1000 INTRAVENOUS SOLUTION INTRAVENOUS ONCE
Status: COMPLETED | OUTPATIENT
Start: 2020-11-25 | End: 2020-11-26

## 2020-11-25 NOTE — PROGRESS NOTES
Chief Complaint   Diarrhea; Shortness of Breath; Rash (x1 day); and Generalized Body Aches (can't raise arms above head stabbing shoulder blade pain)      History of Present Illness   Source of history provided by:  patient. Yang Dhaliwal is a 50 y.o. old female who presents to the flu clinic for progressive weakness in legs starting today, unable to raise arms up above head, rash on bilateral arms starting last night that is spreading, diarrhea, and shortness of breath. She reports that she has not taken anything for her symptoms. She states the symptoms have worsened since onset. She denies any chest pain, fever, chills, nausea, vomiting, diarrhea, abdominal pain, loss of taste/smell, lower extremity edema or exposure to COVID-19. Patient does report that she recently flew to [place] and [place] in the last couple weeks. She reports that she did not go anywhere when she was in those states because she was sick the entire time she was there. ROS   Pertinent positives and negatives are stated within HPI, all other systems reviewed and are negative. Past Medical History:  has a past medical history of Ankle fracture, right, Bipolar affective disorder ([address] Cervical radiculopathy, De Quervain's tenosynovitis, left, Diabetic neuropathy ([address] Displacement of cervical intervertebral disc without myelopathy, Displacement of lumbar intervertebral disc without myelopathy, Headache(784.0), Liver cyst, Long term (current) use of opiate analgesic, Lumbar radiculopathy, Plantar fasciitis, Pulmonary nodules, Thyroid cyst, and Type II or unspecified type diabetes mellitus without mention of complication, not stated as uncontrolled. Past Surgical History:  has a past surgical history that includes Foot surgery (1997, 1998, 2004, 2005,2013); Cholecystectomy; Tonsillectomy and adenoidectomy; Tubal ligation; Ankle fracture surgery (Right, 8/2013); Colonoscopy (7/2010); and back surgery (10/16/2017).   Social History:  reports that she has been smoking. She has a 20.00 pack-year smoking history. She has never used smokeless tobacco. She reports that she does not drink alcohol or use drugs. Family History: family history includes Arthritis in her mother; Cancer in her mother and paternal grandfather; Diabetes in her father; Rheum Arthritis in her mother and sister; Stroke in her mother and sister; Thyroid Disease in her mother. Allergies: Acetaminophen; Hydrocodone; and Vicodin [hydrocodone-acetaminophen]    Physical Exam   Vital Signs:  /80   Pulse 115   Temp 98 °F (36.7 °C)   Ht 5' 10\" (1.778 m)   Wt 192 lb (87.1 kg)   SpO2 98%   BMI 27.55 kg/m²    Oxygen Saturation Interpretation: Normal.    Constitutional:  Alert, development consistent with age. NAD. Head:  NC/NT. Airway patent. Ears: TMs clear bilaterally. Canals without exudate or swelling bilaterally. Mouth: Posterior pharynx with mild erythema and clear postnasal drip. No tonsillar hypertrophy or exudate. Neck:  Normal ROM. Supple. No anterior cervical adenopathy noted. Lungs: CTAB without wheezes, rales, or rhonchi. CV:  Tachycardia with regular rhythm, normal heart sounds, without pathological murmurs, ectopy, gallops, or rubs. Skin:  Normal turgor. Warm, dry, there is a rash noted bilaterally on her arms from the wrist up just above the elbow with signs of excoriation. Patient states the same rash is noted on her legs. Lymphatic: No lymphangitis or adenopathy noted. Musculoskeletal: Patient is able to hold arms up at 90 degrees for 10 seconds without weakness or drift bilaterally. She is able to hold bilateral legs up for 5 seconds without drift or weakness. However, when asked to pick her arms up above her head, she is not able to fully extend her arms above her head.  strength 5/5 bilaterally. Strong plantar and dorsiflexion. Patient able to ambulate self to room without difficulty. Neurological:  Oriented.   Motor functions intact. Lab / Imaging Results   (All laboratory and radiology results have been personally reviewed by myself)  Labs:  Results for orders placed or performed in visit on 11/25/20   POCT COVID-19, Antigen   Result Value Ref Range    SARS-COV-2, POC Not-Detected Not Detected    Lot Number [id number]     QC Pass/Fail pass        Imaging: All Radiology results interpreted by Radiologist unless otherwise noted. No results found. Medical Decision Making   Pt non-toxic, in no apparent distress and stable at time of discharge. Assessment/Plan   Scott Morrell was seen today for diarrhea, shortness of breath, rash and generalized body aches. Diagnoses and all orders for this visit:    Shortness of breath    Anxiety    Encounter for laboratory testing for COVID-19 virus  -     POCT COVID-19, Antigen    Rash      Rapid COVID-19 in office negative, patient advised of results. However based off patient's symptoms Pt advised that she needs a comprehensive work-up workup including stat labs and imaging that is unable to be performed in an urgent care setting. EMS offered to patient but refused at this time. Pt advised to go straight to the ED for further evaluation and management. Pt agreed with this care plan and agreed to go immediately. Pt was transported to the ED by private vehicle. Pt left our office in stable condition. Further disposition to follow. All questions answered. Chico Salmeron, APRN - CNP    This visit was provided as a focused evaluation during the COVID -19 pandemic/national emergency. A comprehensive review of all previous patient history and testing was not conducted. Pertinent findings were elicited during the visit. *NOTE: This report was transcribed using voice recognition software. Every effort was made to ensure accuracy; however, inadvertent computerized transcription errors may be present.

## 2020-11-26 VITALS
BODY MASS INDEX: 27.49 KG/M2 | OXYGEN SATURATION: 97 % | DIASTOLIC BLOOD PRESSURE: 88 MMHG | SYSTOLIC BLOOD PRESSURE: 144 MMHG | WEIGHT: 192 LBS | HEART RATE: 83 BPM | HEIGHT: 70 IN | RESPIRATION RATE: 18 BRPM | TEMPERATURE: 97.6 F

## 2020-11-26 LAB
ANION GAP SERPL CALCULATED.3IONS-SCNC: 13 MMOL/L (ref 7–16)
BASOPHILS ABSOLUTE: 0.02 E9/L (ref 0–0.2)
BASOPHILS RELATIVE PERCENT: 0.2 % (ref 0–2)
BUN BLDV-MCNC: 17 MG/DL (ref 6–20)
CALCIUM SERPL-MCNC: 10.1 MG/DL (ref 8.6–10.2)
CHLORIDE BLD-SCNC: 104 MMOL/L (ref 98–107)
CO2: 24 MMOL/L (ref 22–29)
CREAT SERPL-MCNC: 1.1 MG/DL (ref 0.5–1)
EOSINOPHILS ABSOLUTE: 0.07 E9/L (ref 0.05–0.5)
EOSINOPHILS RELATIVE PERCENT: 0.7 % (ref 0–6)
GFR AFRICAN AMERICAN: >60
GFR NON-AFRICAN AMERICAN: 53 ML/MIN/1.73
GLUCOSE BLD-MCNC: 118 MG/DL (ref 74–99)
HCT VFR BLD CALC: 41.2 % (ref 34–48)
HEMOGLOBIN: 13.7 G/DL (ref 11.5–15.5)
IMMATURE GRANULOCYTES #: 0.02 E9/L
IMMATURE GRANULOCYTES %: 0.2 % (ref 0–5)
LACTIC ACID: 1 MMOL/L (ref 0.5–2.2)
LYMPHOCYTES ABSOLUTE: 2.64 E9/L (ref 1.5–4)
LYMPHOCYTES RELATIVE PERCENT: 26 % (ref 20–42)
MAGNESIUM: 2.5 MG/DL (ref 1.6–2.6)
MCH RBC QN AUTO: 30.9 PG (ref 26–35)
MCHC RBC AUTO-ENTMCNC: 33.3 % (ref 32–34.5)
MCV RBC AUTO: 92.8 FL (ref 80–99.9)
MONOCYTES ABSOLUTE: 0.75 E9/L (ref 0.1–0.95)
MONOCYTES RELATIVE PERCENT: 7.4 % (ref 2–12)
NEUTROPHILS ABSOLUTE: 6.65 E9/L (ref 1.8–7.3)
NEUTROPHILS RELATIVE PERCENT: 65.5 % (ref 43–80)
PDW BLD-RTO: 14 FL (ref 11.5–15)
PLATELET # BLD: 220 E9/L (ref 130–450)
PMV BLD AUTO: 11.5 FL (ref 7–12)
POTASSIUM REFLEX MAGNESIUM: 3.5 MMOL/L (ref 3.5–5)
RBC # BLD: 4.44 E12/L (ref 3.5–5.5)
SODIUM BLD-SCNC: 141 MMOL/L (ref 132–146)
STREP GRP A PCR: NEGATIVE
TROPONIN: <0.01 NG/ML (ref 0–0.03)
WBC # BLD: 10.2 E9/L (ref 4.5–11.5)

## 2020-11-26 PROCEDURE — 83735 ASSAY OF MAGNESIUM: CPT

## 2020-11-26 PROCEDURE — 80048 BASIC METABOLIC PNL TOTAL CA: CPT

## 2020-11-26 PROCEDURE — 2580000003 HC RX 258: Performed by: EMERGENCY MEDICINE

## 2020-11-26 PROCEDURE — 83605 ASSAY OF LACTIC ACID: CPT

## 2020-11-26 PROCEDURE — 85025 COMPLETE CBC W/AUTO DIFF WBC: CPT

## 2020-11-26 PROCEDURE — 84484 ASSAY OF TROPONIN QUANT: CPT

## 2020-11-26 PROCEDURE — 6370000000 HC RX 637 (ALT 250 FOR IP): Performed by: EMERGENCY MEDICINE

## 2020-11-26 PROCEDURE — 6360000002 HC RX W HCPCS: Performed by: EMERGENCY MEDICINE

## 2020-11-26 RX ORDER — LORAZEPAM 1 MG/1
1 TABLET ORAL EVERY 12 HOURS PRN
Qty: 15 TABLET | Refills: 0 | Status: SHIPPED | OUTPATIENT
Start: 2020-11-26 | End: 2020-12-26

## 2020-11-26 RX ORDER — LORAZEPAM 1 MG/1
1 TABLET ORAL ONCE
Status: COMPLETED | OUTPATIENT
Start: 2020-11-26 | End: 2020-11-26

## 2020-11-26 RX ADMIN — KETOROLAC TROMETHAMINE 15 MG: 30 INJECTION, SOLUTION INTRAMUSCULAR at 00:03

## 2020-11-26 RX ADMIN — SODIUM CHLORIDE 1000 ML: 9 INJECTION, SOLUTION INTRAVENOUS at 00:03

## 2020-11-26 RX ADMIN — LORAZEPAM 1 MG: 1 TABLET ORAL at 00:34

## 2020-11-26 ASSESSMENT — PAIN SCALES - GENERAL
PAINLEVEL_OUTOF10: 8
PAINLEVEL_OUTOF10: 7

## 2020-11-26 NOTE — ED NOTES
Spoke with Dr Janae Hernandez regarding order for COVID swab. Patient had a negative COVID test yesterday at urgent care. Okay to d/c order.       Fransisca Beebe RN  11/26/20 7502

## 2020-11-26 NOTE — ED NOTES
Discharge instructions given and patient verbalized understanding and amb out of the er without difficulty     Shilpa Mendez, RN  51/97/64 [id number]

## 2020-11-26 NOTE — ED PROVIDER NOTES
HPI:  11/25/20,   Time: 11:44 PM ALVIN Welch is a 50 y.o. female presenting to the ED for cough sore throat and shortness of breath and anxiety, beginning last few days ago. The complaint has been constant, moderate in severity, and worsened by light exertion. No fever chills night sweats complains of fatigue    ROS:   Pertinent positives and negatives are stated within HPI, all other systems reviewed and are negative.  --------------------------------------------- PAST HISTORY ---------------------------------------------  Past Medical History:  has a past medical history of Ankle fracture, right, Bipolar affective disorder ([address] Cervical radiculopathy, De Quervain's tenosynovitis, left, Diabetic neuropathy ([address] Displacement of cervical intervertebral disc without myelopathy, Displacement of lumbar intervertebral disc without myelopathy, Headache(784.0), Liver cyst, Long term (current) use of opiate analgesic, Lumbar radiculopathy, Plantar fasciitis, Pulmonary nodules, Thyroid cyst, and Type II or unspecified type diabetes mellitus without mention of complication, not stated as uncontrolled. Past Surgical History:  has a past surgical history that includes Foot surgery (1997, 1998, 2004, 2005,2013); Cholecystectomy; Tonsillectomy and adenoidectomy; Tubal ligation; Ankle fracture surgery (Right, 8/2013); Colonoscopy (7/2010); and back surgery (10/16/2017). Social History:  reports that she has been smoking. She has a 20.00 pack-year smoking history. She has never used smokeless tobacco. She reports that she does not drink alcohol or use drugs. Family History: family history includes Arthritis in her mother; Cancer in her mother and paternal grandfather; Diabetes in her father; Rheum Arthritis in her mother and sister; Stroke in her mother and sister; Thyroid Disease in her mother. The patients home medications have been reviewed.     Allergies: Acetaminophen; Hydrocodone; and Vicodin [hydrocodone-acetaminophen]    -------------------------------------------------- RESULTS -------------------------------------------------  All laboratory and radiology results have been personally reviewed by myself   LABS:  Results for orders placed or performed during the hospital encounter of 11/25/20   Strep Screen Group A Throat    Specimen: Throat   Result Value Ref Range    Strep Grp A PCR Negative Negative   CBC Auto Differential   Result Value Ref Range    WBC 10.2 4.5 - 11.5 E9/L    RBC 4.44 3.50 - 5.50 E12/L    Hemoglobin 13.7 11.5 - 15.5 g/dL    Hematocrit 41.2 34.0 - 48.0 %    MCV 92.8 80.0 - 99.9 fL    MCH 30.9 26.0 - 35.0 pg    MCHC 33.3 32.0 - 34.5 %    RDW 14.0 11.5 - 15.0 fL    Platelets 362 178 - 858 E9/L    MPV 11.5 7.0 - 12.0 fL    Neutrophils % 65.5 43.0 - 80.0 %    Immature Granulocytes % 0.2 0.0 - 5.0 %    Lymphocytes % 26.0 20.0 - 42.0 %    Monocytes % 7.4 2.0 - 12.0 %    Eosinophils % 0.7 0.0 - 6.0 %    Basophils % 0.2 0.0 - 2.0 %    Neutrophils Absolute 6.65 1.80 - 7.30 E9/L    Immature Granulocytes # 0.02 E9/L    Lymphocytes Absolute 2.64 1.50 - 4.00 E9/L    Monocytes Absolute 0.75 0.10 - 0.95 E9/L    Eosinophils Absolute 0.07 0.05 - 0.50 E9/L    Basophils Absolute 0.02 0.00 - 0.20 Z2/N   Basic Metabolic Panel w/ Reflex to MG   Result Value Ref Range    Sodium 141 132 - 146 mmol/L    Potassium reflex Magnesium 3.5 3.5 - 5.0 mmol/L    Chloride 104 98 - 107 mmol/L    CO2 24 22 - 29 mmol/L    Anion Gap 13 7 - 16 mmol/L    Glucose 118 (H) 74 - 99 mg/dL    BUN 17 6 - 20 mg/dL    CREATININE 1.1 (H) 0.5 - 1.0 mg/dL    GFR Non-African American 53 >=60 mL/min/1.73    GFR African American >60     Calcium 10.1 8.6 - 10.2 mg/dL   Troponin   Result Value Ref Range    Troponin <0.01 0.00 - 0.03 ng/mL   Lactic Acid, Plasma   Result Value Ref Range    Lactic Acid 1.0 0.5 - 2.2 mmol/L   Magnesium   Result Value Ref Range    Magnesium 2.5 1.6 - 2.6 mg/dL       RADIOLOGY:  Interpreted by plan.      --------------------------------- IMPRESSION AND DISPOSITION ---------------------------------    IMPRESSION  1. Viral illness Stable   2.  Anxiety state Stable       DISPOSITION  Disposition: Discharge to home  Patient condition is stable        ED course: Patient feeling significantly better after Mickey Pete MD  11/26/20 0121

## 2020-11-27 ENCOUNTER — VIRTUAL VISIT (OUTPATIENT)
Dept: FAMILY MEDICINE CLINIC | Age: 45
End: 2020-11-27
Payer: MEDICARE

## 2020-11-27 PROCEDURE — G8427 DOCREV CUR MEDS BY ELIG CLIN: HCPCS | Performed by: STUDENT IN AN ORGANIZED HEALTH CARE EDUCATION/TRAINING PROGRAM

## 2020-11-27 PROCEDURE — G8431 POS CLIN DEPRES SCRN F/U DOC: HCPCS | Performed by: STUDENT IN AN ORGANIZED HEALTH CARE EDUCATION/TRAINING PROGRAM

## 2020-11-27 PROCEDURE — G8419 CALC BMI OUT NRM PARAM NOF/U: HCPCS | Performed by: STUDENT IN AN ORGANIZED HEALTH CARE EDUCATION/TRAINING PROGRAM

## 2020-11-27 PROCEDURE — G8484 FLU IMMUNIZE NO ADMIN: HCPCS | Performed by: STUDENT IN AN ORGANIZED HEALTH CARE EDUCATION/TRAINING PROGRAM

## 2020-11-27 PROCEDURE — 4004F PT TOBACCO SCREEN RCVD TLK: CPT | Performed by: STUDENT IN AN ORGANIZED HEALTH CARE EDUCATION/TRAINING PROGRAM

## 2020-11-27 PROCEDURE — 99213 OFFICE O/P EST LOW 20 MIN: CPT | Performed by: STUDENT IN AN ORGANIZED HEALTH CARE EDUCATION/TRAINING PROGRAM

## 2020-11-27 ASSESSMENT — PATIENT HEALTH QUESTIONNAIRE - PHQ9
8. MOVING OR SPEAKING SO SLOWLY THAT OTHER PEOPLE COULD HAVE NOTICED. OR THE OPPOSITE, BEING SO FIGETY OR RESTLESS THAT YOU HAVE BEEN MOVING AROUND A LOT MORE THAN USUAL: 0
7. TROUBLE CONCENTRATING ON THINGS, SUCH AS READING THE NEWSPAPER OR WATCHING TELEVISION: 3
SUM OF ALL RESPONSES TO PHQ QUESTIONS 1-9: 16
9. THOUGHTS THAT YOU WOULD BE BETTER OFF DEAD, OR OF HURTING YOURSELF: 0
4. FEELING TIRED OR HAVING LITTLE ENERGY: 3
SUM OF ALL RESPONSES TO PHQ9 QUESTIONS 1 & 2: 5
6. FEELING BAD ABOUT YOURSELF - OR THAT YOU ARE A FAILURE OR HAVE LET YOURSELF OR YOUR FAMILY DOWN: 2
3. TROUBLE FALLING OR STAYING ASLEEP: 3
5. POOR APPETITE OR OVEREATING: 0
1. LITTLE INTEREST OR PLEASURE IN DOING THINGS: 2
SUM OF ALL RESPONSES TO PHQ QUESTIONS 1-9: 16
SUM OF ALL RESPONSES TO PHQ QUESTIONS 1-9: 16
2. FEELING DOWN, DEPRESSED OR HOPELESS: 3
10. IF YOU CHECKED OFF ANY PROBLEMS, HOW DIFFICULT HAVE THESE PROBLEMS MADE IT FOR YOU TO DO YOUR WORK, TAKE CARE OF THINGS AT HOME, OR GET ALONG WITH OTHER PEOPLE: 3

## 2020-11-27 ASSESSMENT — ENCOUNTER SYMPTOMS
CONSTIPATION: 0
SHORTNESS OF BREATH: 0
CHEST TIGHTNESS: 0
NAUSEA: 0
VOMITING: 0
EYE PAIN: 0
ABDOMINAL PAIN: 0
WHEEZING: 0
SINUS PRESSURE: 0
COUGH: 0
DIARRHEA: 0

## 2020-11-27 ASSESSMENT — COLUMBIA-SUICIDE SEVERITY RATING SCALE - C-SSRS
6. HAVE YOU EVER DONE ANYTHING, STARTED TO DO ANYTHING, OR PREPARED TO DO ANYTHING TO END YOUR LIFE?: NO
1. WITHIN THE PAST MONTH, HAVE YOU WISHED YOU WERE DEAD OR WISHED YOU COULD GO TO SLEEP AND NOT WAKE UP?: NO
2. HAVE YOU ACTUALLY HAD ANY THOUGHTS OF KILLING YOURSELF?: NO

## 2020-11-27 NOTE — PROGRESS NOTES
Patient:  Radha Rosado 50 y.o. female     Date of Service: 11/27/20      Chiefcomplaint:   Chief Complaint   Patient presents with    Anxiety    Panic Attack     started 2 weeks ago     History of Present Illness     Hx anxiety, bipolar  Frequent panic attacks recently - went to ED  Usually able to \"get over it\". This lasted longer and she was having joint and muscle ache. She had no energy to do anything. covid neg. Sent home on ativan. Took one yesterday and today. Helping. Follows with psychiatry - sent her to the ED but she hasn't talked to them since she got dcd' on ativan. In the past she says her psychiatrist recommended she should come to pcp for anxiety/panic. She lets her go to 300mg lamictal on her own, which she has already done. Trazodone not helping with sleep. Stopped adderall but still wasn't helping. Getting 2 hours a night. Normally sleeps 15-18 hours a day. Stress is increased lately    Review of Systems:   Review of Systems   Constitutional: Negative for chills and fever. HENT: Negative for congestion and sinus pressure. Eyes: Negative for pain and visual disturbance. Respiratory: Negative for cough, chest tightness, shortness of breath and wheezing. Cardiovascular: Negative for chest pain and palpitations. Gastrointestinal: Negative for abdominal pain, constipation, diarrhea, nausea and vomiting. Genitourinary: Negative for dysuria, frequency and urgency. Musculoskeletal: Positive for arthralgias. Negative for neck pain and neck stiffness. Skin: Negative for pallor and wound. Neurological: Negative for dizziness, weakness, light-headedness and headaches. Hematological: Does not bruise/bleed easily. Psychiatric/Behavioral: Positive for dysphoric mood and sleep disturbance. Negative for agitation, behavioral problems, self-injury and suicidal ideas. The patient is nervous/anxious.       Pertinent Medical, Family, Surgical, Social History:    Past Medical History:   Diagnosis Date    Ankle fracture, right 2013    hardware insertion; fracture after a fall from 40 feet     Bipolar affective disorder ([facility]     [facility] Cervical radiculopathy     De Quervain's tenosynovitis, left     Diabetic neuropathy ([facility]     Displacement of cervical intervertebral disc without myelopathy     Displacement of lumbar intervertebral disc without myelopathy     Headache(784.0)     Liver cyst     Long term (current) use of opiate analgesic     [facility]    Lumbar radiculopathy     Plantar fasciitis     Dr Mayda Marie Pulmonary nodules     Cleared by Dr Monge [place]; stable nodules    Thyroid cyst     biopsied in 2011, but report was unsatisfactory     Type II or unspecified type diabetes mellitus without mention of complication, not stated as uncontrolled      Past Surgical History:   Procedure Laterality Date    ANKLE FRACTURE SURGERY Right 8/2013    [facility]  10/16/2017    PLIF L4-5 L5-s1    CHOLECYSTECTOMY      COLONOSCOPY  7/2010    Dr Aiyana Tejada; normal except for hemorrhoids    [address], 2004, 2005,2013    podaitrist; panatar fasciits, heel spuirs, tendon lengthening.  TONSILLECTOMY AND ADENOIDECTOMY      TUBAL LIGATION       Relationships   Social connections    Talks on phone: Not on file    Gets together: Not on file    Attends Oriental orthodox service: Not on file    Active member of club or organization: Not on file    Attends meetings of clubs or organizations: Not on file    Relationship status: Not on file           Physical Exam   Vitals: LMP  (LMP Unknown)   General Appearance: Alert, oriented, no acute distress        Psych: Mood appears stable. No agitation. Affect normal. Expressed desire to get better. No suicidal or homicidal ideation. Though content normal. Clear decision making. No rapid speech. Assessment and Plan     1.  Bipolar affective disorder, current episode hypomanic ([address]  Overall patient appears to have some increased variability in mood recently with some hypomanic features including decrease sleep, increased activity with some disruption to normal activities. No grandiose thinking however and also does note some depressed mood and anhedonia recently as well. No indication for hospitalization however. Recommend discussion with psych today about mood instability with potential changes to medication. Would also recommend she discuss recent introduction of ativan to med list and lack of effect of trazodone. 2. Positive depression screening  Follow with psych today. No si, hi.   - Positive Screen for Clinical Depression with a Documented Follow-up Plan           Return to Office: No follow-ups on file. Margaret Durant DO       This document may have been prepared at least partially through the use of voice recognition software. Although effort is taken to assure the accuracy of this document, it is possible that grammatical, syntax,  or spelling errors may occur.

## 2020-11-27 NOTE — PROGRESS NOTES
S: 50 y.o. female with   Chief Complaint   Patient presents with    Anxiety    Panic Attack     started 2 weeks ago       ASAF/Bipolar/Panic. Seen in the ER recently - follows with psych. Sx present about a week. Er prescribed ativan. Has used 2 doses. Has not yet seen psych. Sx overall have improve somewhat. BP Readings from Last 3 Encounters:   11/26/20 (!) 144/88   11/25/20 130/80   08/06/20 115/62       O: VS:  vitals were not taken for this visit. AAO/NAD, appropriate affect for mood  CV:  RRR, no murmur  Resp: CTAB  anxious    Impression/Plan:   1) ASAF/Panic/Bipolar - stable/improved with ativan - needs to have psych follow up         Health Maintenance Due   Topic Date Due    Diabetic foot exam  08/09/1983    HIV screen  08/09/1988    Hepatitis B vaccine (1 of 3 - Risk 3-dose series) 08/09/1992    DTaP/Tdap/Td vaccine (1 - Tdap) 08/09/1992    Cervical cancer screen  08/09/1994    Diabetic retinal exam  08/06/2010    Diabetic microalbuminuria test  10/01/2019    Annual Wellness Visit (AWV)  05/20/2020    Colon cancer screen colonoscopy  07/26/2020    Flu vaccine (1) 09/01/2020         Attending Physician Statement  I have discussed the case, including pertinent history and exam findings with the resident. I agree with the documented assessment and plan.       Jesús Bunch MD

## 2020-11-27 NOTE — PROGRESS NOTES
TeleMedicine Patient Consent    This visit was performed as a virtual video visit using a synchronous, two-way, audio-video telehealth technology platform. Patient identification was verified at the start of the visit, including the patient's telephone number and physical location. I discussed with the patient the nature of our telehealth visits, that:     1. Due to the nature of an audio- video modality, the only components of a physical exam that could be done are the elements supported by direct observation. 2. The provider will evaluate the patient and recommend diagnostics and treatments based on their assessment. 3. If it was felt that the patient should be evaluated in clinic or an emergency room setting, then they would be directed there. 4. Our sessions are not being recorded and that personal health information is protected. 5. Our team would provide follow up care in person if/when the patient needs it. Patient does agree to proceed with telemedicine consultation. Patient location: home address in [facility]    Physician location: regular office location Other people involved in call: Preceptor  This visit was completed virtually using Doxy. me    This visit was performed during the 9458 public health crisis and COVID-19 pandemic.   *Add GT modifier to all Video Visits*

## 2020-12-07 ENCOUNTER — TELEPHONE (OUTPATIENT)
Dept: FAMILY MEDICINE CLINIC | Age: 45
End: 2020-12-07

## 2020-12-07 ENCOUNTER — HOSPITAL ENCOUNTER (OUTPATIENT)
Dept: GENERAL RADIOLOGY | Age: 45
Discharge: HOME | End: 2020-12-09
Payer: MEDICARE

## 2020-12-07 PROCEDURE — 77065 DX MAMMO INCL CAD UNI: CPT

## 2020-12-07 PROCEDURE — 76642 ULTRASOUND BREAST LIMITED: CPT

## 2020-12-07 PROCEDURE — G0279 TOMOSYNTHESIS, MAMMO: HCPCS

## 2020-12-07 NOTE — TELEPHONE ENCOUNTER
Patient states she has a rash that started between her fingers and has moved to her hands, forearms, chest, and neck. They were tiny blisters and now are big sores. When asked if she had been scratching them she said no but some are so big she thinks they could use a stitch. Said she had the rash back when she went to the ED and they did not help her. She said it hurts to bend her fingers or bend over. She did not go out of town as planned. Please advise.

## 2020-12-08 ENCOUNTER — TELEPHONE (OUTPATIENT)
Dept: GENERAL RADIOLOGY | Age: 45
End: 2020-12-08

## 2020-12-08 NOTE — TELEPHONE ENCOUNTER
Left voicemail message for Dali Bustamante to call me.  Electronically signed by Jessica Lock RN, BSN on 12/8/2020 at 8:22 AM

## 2020-12-08 NOTE — TELEPHONE ENCOUNTER
Patient was unable to complete the e-visit was advised to call PCP. She states she gets some spells where her arms \"muscle in forearm\" feel hot and swollen but it goes away after 30 minutes. She feels it is improving some. She will try to send picture through [address]

## 2020-12-13 ASSESSMENT — ENCOUNTER SYMPTOMS
NAUSEA: 0
SHORTNESS OF BREATH: 0

## 2020-12-13 NOTE — PROGRESS NOTES
Patient:  George Ceja 50 y.o. female     Date of Service: 11/27/20      Chiefcomplaint:   Chief Complaint   Patient presents with    Rash    Cellulitis     hospital f/u - was in hospital yesterday at [facility]      History of Present Illness     Patient here with rash on her hands and arms and back. Started at the end of November. Started with small fluid filled blisters. She was feeling warmth in the joints and wrists. There was no pain or itching. Those blisters have now popped and replaced by open sores. She has not noticed other symptoms. She went to [place] yesterday and started on clinda. She was advised to stop abx from our office. She is improving overall. Review of Systems:   Review of Systems   Constitutional: Negative for chills and fever. Respiratory: Negative for shortness of breath. Cardiovascular: Negative for chest pain. Gastrointestinal: Negative for nausea. Skin: Positive for rash and wound. Neurological: Negative for light-headedness.      Pertinent Medical, Family, Surgical, Social History:    Past Medical History:   Diagnosis Date    Ankle fracture, right 2013    hardware insertion; fracture after a fall from 40 feet     Bipolar affective disorder ([address]     [facility] Cervical radiculopathy     De Quervain's tenosynovitis, left     Diabetic neuropathy ([address]     Displacement of cervical intervertebral disc without myelopathy     Displacement of lumbar intervertebral disc without myelopathy     Headache(784.0)     Liver cyst     Long term (current) use of opiate analgesic     Pain Contract with [facility]    Lumbar radiculopathy     Plantar fasciitis     Dr Antonino Wright Pulmonary nodules     Cleared by Dr Ariane Long; stable nodules    Thyroid cyst     biopsied in 2011, but report was unsatisfactory     Type II or unspecified type diabetes mellitus without mention of complication, not stated as uncontrolled Past Surgical History:   Procedure Laterality Date    [facility] 8/2013    [facility]     BACK SURGERY  10/16/2017    PLIF L4-5 L5-s1    CHOLECYSTECTOMY      COLONOSCOPY  7/2010    Dr Wes Ramirez; normal except for hemorrhoids    [address], 2004, 2005,2013    podaitrist; panatar fasciits, heel spuirs, tendon lengthening.  TONSILLECTOMY AND ADENOIDECTOMY      TUBAL LIGATION       Relationships   Social connections    Talks on phone: Not on file    Gets together: Not on file    Attends Anab[employer]ist service: Not on file    Active member of club or organization: Not on file    Attends meetings of clubs or organizations: Not on file    Relationship status: Not on file       Physical Exam   BP (!) 142/90   Pulse 98   Temp 97.4 °F (36.3 °C)   Resp 18   Ht 5' 10\" (1.778 m)   Wt 187 lb 12.8 oz (85.2 kg)   LMP  (LMP Unknown)   SpO2 98%   BMI 26.95 kg/m²   Constitutional:  Alert, development consistent with age. NAD. Neck:  Normal ROM. Supple. No anterior cervical adenopathy noted. Lungs: CTAB without wheezes, rales, or rhonchi. CV:  regular rhythm, normal heart sounds, without pathological murmurs, ectopy, gallops, or rubs. SKin:     Neurological:  Oriented. Motor functions intact. Psych: Mood appears stable. No agitation. Affect normal.     Assessment and Plan     Aileen Cisneros was seen today for rash and cellulitis. Diagnoses and all orders for this visit:    Rash and nonspecific skin eruption  continue clindamycin as she is clinically improving. If recurs recommend derm. bactroban for trouble spots. -     mupirocin (BACTROBAN) 2 % ointment; Apply 3 times daily. Chronic pain syndrome  -     meloxicam (MOBIC) 7.5 MG tablet; Take 1 tablet by mouth daily as needed for Pain      Return to Office: Return if symptoms worsen or fail to improve.       Marina Garner,        This document may have been prepared at least partially through the use of voice recognition software. Although effort is taken to assure the accuracy of this document, it is possible that grammatical, syntax,  or spelling errors may occur.

## 2020-12-14 ENCOUNTER — OFFICE VISIT (OUTPATIENT)
Dept: FAMILY MEDICINE CLINIC | Age: 45
End: 2020-12-14
Payer: MEDICARE

## 2020-12-14 VITALS
HEIGHT: 70 IN | BODY MASS INDEX: 26.88 KG/M2 | DIASTOLIC BLOOD PRESSURE: 90 MMHG | OXYGEN SATURATION: 98 % | HEART RATE: 98 BPM | SYSTOLIC BLOOD PRESSURE: 142 MMHG | TEMPERATURE: 97.4 F | RESPIRATION RATE: 18 BRPM | WEIGHT: 187.8 LBS

## 2020-12-14 DIAGNOSIS — R21 RASH AND NONSPECIFIC SKIN ERUPTION: Primary | ICD-10-CM

## 2020-12-14 DIAGNOSIS — G89.4 CHRONIC PAIN SYNDROME: ICD-10-CM

## 2020-12-14 PROCEDURE — 4004F PT TOBACCO SCREEN RCVD TLK: CPT | Performed by: STUDENT IN AN ORGANIZED HEALTH CARE EDUCATION/TRAINING PROGRAM

## 2020-12-14 PROCEDURE — G8419 CALC BMI OUT NRM PARAM NOF/U: HCPCS | Performed by: STUDENT IN AN ORGANIZED HEALTH CARE EDUCATION/TRAINING PROGRAM

## 2020-12-14 PROCEDURE — G8484 FLU IMMUNIZE NO ADMIN: HCPCS | Performed by: STUDENT IN AN ORGANIZED HEALTH CARE EDUCATION/TRAINING PROGRAM

## 2020-12-14 PROCEDURE — G8427 DOCREV CUR MEDS BY ELIG CLIN: HCPCS | Performed by: STUDENT IN AN ORGANIZED HEALTH CARE EDUCATION/TRAINING PROGRAM

## 2020-12-14 PROCEDURE — 99213 OFFICE O/P EST LOW 20 MIN: CPT | Performed by: STUDENT IN AN ORGANIZED HEALTH CARE EDUCATION/TRAINING PROGRAM

## 2020-12-14 RX ORDER — MELOXICAM 7.5 MG/1
7.5 TABLET ORAL DAILY PRN
Qty: 90 TABLET | Refills: 1 | Status: SHIPPED
Start: 2020-12-14 | End: 2021-09-16 | Stop reason: SDUPTHER

## 2020-12-14 NOTE — PROGRESS NOTES
[address] [address]  Precepting Note    Subjective:  Skin eruption  See my note from phone messages  Went to ER in [employer]- changed to clindamycin  Photos in emr  Some improvement    ROS otherwise negative     Past medical, surgical, family and social history were reviewed, non-contributory, and unchanged unless otherwise stated. Objective:    BP (!) 142/90   Pulse 98   Temp 97.4 °F (36.3 °C)   Resp 18   Ht 5' 10\" (1.778 m)   Wt 187 lb 12.8 oz (85.2 kg)   LMP  (LMP Unknown)   SpO2 98%   BMI 26.95 kg/m²     Exam is as noted by resident with the following changes, additions or corrections:    See photo in EMR     Assessment/Plan:  Pustular vs follicular infection of skin   Doubt allergic reaction   Ok finish clindamycin   Check ASO titer      Attending Physician Statement  I have reviewed the chart, including any radiology or labs. I have discussed the case, including pertinent history and exam findings with the resident. I agree with the assessment, plan and orders as documented by the resident. Please refer to the resident note for additional information.       Electronically signed by Karishma Nash MD on 12/14/2020 at 3:20 PM

## 2020-12-15 ENCOUNTER — TELEPHONE (OUTPATIENT)
Dept: GENERAL RADIOLOGY | Age: 45
End: 2020-12-15

## 2020-12-15 NOTE — TELEPHONE ENCOUNTER
Call to [facility] regarding recommendation for left breast biopsy. [facility] states she is going to [place] for 3-6 months and will call to schedule her left breast biopsy once she knows a return date to [place]. She does not want to have biopsy done at this time.   Electronically signed by Martina Amezcua RN, BSN on 12/15/2020 at 10:38 AM

## 2021-02-26 DIAGNOSIS — G89.4 CHRONIC PAIN SYNDROME: ICD-10-CM

## 2021-02-26 RX ORDER — GABAPENTIN 400 MG/1
400 CAPSULE ORAL 4 TIMES DAILY
Qty: 120 CAPSULE | Refills: 2 | Status: SHIPPED
Start: 2021-02-26 | End: 2021-06-15

## 2021-02-26 RX ORDER — IBUPROFEN 800 MG/1
800 TABLET ORAL EVERY 8 HOURS PRN
Qty: 90 TABLET | Refills: 1 | OUTPATIENT
Start: 2021-02-26

## 2021-02-26 NOTE — TELEPHONE ENCOUNTER
Should not be on both Ibuprofen and Mobic. Needs to pick only one.   Risk of kidney damage or GI bleed if taking both  Changing between the 2 meds is also discouraged

## 2021-04-06 ENCOUNTER — TELEPHONE (OUTPATIENT)
Dept: GENERAL RADIOLOGY | Age: 46
End: 2021-04-06

## 2021-04-06 NOTE — TELEPHONE ENCOUNTER
Scheduled for left breast stereotactic biopsy on 5/11/21 at 1pm per patient request.  She indicates that she will speak to her physician about premedication for biopsy.   Electronically signed by Kalyan Barr RN, BSN on 4/6/2021 at 4:19 PM

## 2021-04-06 NOTE — TELEPHONE ENCOUNTER
Robel Cheema left me a voice mail message that she was back in [place] and would like to schedule her left breast biopsy. I called her back and left her a message to call me to schedule.  Electronically signed by Jose M Fink RN, BSN on 4/6/2021 at 8:24 AM

## 2021-05-04 ENCOUNTER — CARE COORDINATION (OUTPATIENT)
Dept: CARE COORDINATION | Age: 46
End: 2021-05-04

## 2021-05-04 NOTE — CARE COORDINATION
Called and spoke with pt to discuss the care coordination program and offer enrollment. Introduced self and explained role and program in detail. Pt declining enrollment at this time.   Provided pt with AC's contact information via [employer] and encouraged pt to call if future needs do arise

## 2021-05-10 NOTE — PROGRESS NOTES
radiculopathy     Plantar fasciitis     Dr Louis Marinette Pulmonary nodules     Cleared by Dr Zoey Fontana; stable nodules    Thyroid cyst     biopsied in 2011, but report was unsatisfactory     Type II or unspecified type diabetes mellitus without mention of complication, not stated as uncontrolled          PE:  VS:  /73   Pulse 94   Temp 97.2 °F (36.2 °C) (Temporal)   Resp 20   Ht 5' 10\" (1.778 m)   Wt 204 lb (92.5 kg)   LMP  (LMP Unknown)   SpO2 98%   BMI 29.27 kg/m²   Physical Exam  Vitals signs reviewed. Constitutional:       General: She is not in acute distress. Appearance: Normal appearance. Cardiovascular:      Rate and Rhythm: Normal rate and regular rhythm. Pulses: Normal pulses. Heart sounds: No murmur. Pulmonary:      Breath sounds: Normal breath sounds. No wheezing, rhonchi or rales. Abdominal:      General: Bowel sounds are normal. There is no distension. Tenderness: There is no abdominal tenderness. Musculoskeletal:      Left hip: She exhibits normal range of motion, normal strength, no tenderness and no bony tenderness. Left knee: She exhibits normal range of motion. No tenderness found. Thoracic back: She exhibits normal range of motion and no tenderness. Lumbar back: She exhibits decreased range of motion, tenderness (Left-sided lumbar paravertebral muscles) and spasm (Left-sided paravertebral muscles). She exhibits no bony tenderness. Comments: Positive straight leg raise on left, negative on right   Neurological:      Mental Status: She is alert. Assessment (including specific orders/meds/labs):      Destiny was seen today for back pain and leg pain. Diagnoses and all orders for this visit:    Radiculopathy, lumbar region  -     MRI LUMBAR SPINE [address];  Future  -     predniSONE (DELTASONE) 10 MG tablet; 5 tab po qday x 2 d, then 4 tab po qday x 2 d, then 3 tab po qday x 2 d, then 2 tab po qday x 2 day, then 1 tab po qd x 2 days then stop. -     tiZANidine (ZANAFLEX) 4 MG tablet; Take 1 tablet by mouth every 8 hours as needed (muscle spasm)  -     oxyCODONE (ROXICODONE) 5 MG immediate release tablet; Take 1 tablet by mouth every 6 hours as needed for Pain for up to 3 days. Intended supply: 3 days. Take lowest dose possible to manage pain  -     triamcinolone acetonide (KENALOG-40) injection 40 mg  -     External Referral To Neurosurgery    Spinal stenosis, lumbar region, without neurogenic claudication  -     MRI LUMBAR SPINE WO CONTRAST; Future  -     oxyCODONE (ROXICODONE) 5 MG immediate release tablet; Take 1 tablet by mouth every 6 hours as needed for Pain for up to 3 days. Intended supply: 3 days. Take lowest dose possible to manage pain  -     triamcinolone acetonide (KENALOG-40) injection 40 mg  -     External Referral To Neurosurgery    Hip dysplasia, congenital  -     XR HIP LEFT (2-3 VIEWS); Future  -     XR HIP RIGHT (2-3 VIEWS); Future    Medication management  -     atorvastatin (LIPITOR) 20 MG tablet; Take 1 tablet by mouth daily        Plan:     Chronic meds refilled were indicated    Acute issue of lumbar radiculopathy. Recommending repeat imaging including MRI. I will check x-rays of the hip given her self disclosure of hip dysplasia that was congenital.  I do think this is more radicular in nature. Steroids and muscle relaxers are the main focus of treatment. After discussion with the patient, I will provide a very small quantity of narcotics to only be used for 3 days. I will not continue this prescription beyond. She is also to continue her gabapentin. Risks and benefits of these medications were discussed. OARRS was reviewed, no concerning findings identified.   I have also referred her to neurosurgery per her request.  She was given a Kenalog injection in the office as well per her request.    Counseled regarding above diagnosis, including possible risks and complications,  especially if left uncontrolled. Counseled regarding the possible sideeffects, risks, benefits and alternatives to treatment; patient and/or guardian verbalizes understanding, agrees, feels comfortable with and wishes to proceed with above treatment plan. Call or go to ED immediately if symptoms worsen or persist. Advised patient to call with any new medication issues, and, as applicable, read all Rx info from pharmacy to assure aware ofall possible risks and side effects of medication before taking. Patient and/or guardian given opportunity to ask questions/raise concerns. She verbalized comfort and understanding of instructions. Follow Up:     Return for 1 week after imaging/testing and surgeeon evaluation to review. or sooner if the above issues change unexpectedly or new issues develop     This document was prepared at least partially through the use of[employer] recognition software. Although effort is taken to assure the accuracy of this document, it is possible that grammatical, syntax,  or spelling errors may occur.       Electronically signed by Jorge Torres MD [facility]

## 2021-05-11 ENCOUNTER — HOSPITAL ENCOUNTER (OUTPATIENT)
Dept: GENERAL RADIOLOGY | Age: 46
Discharge: HOME | End: 2021-05-13
Payer: MEDICARE

## 2021-05-11 ENCOUNTER — OFFICE VISIT (OUTPATIENT)
Dept: FAMILY MEDICINE CLINIC | Age: 46
End: 2021-05-11
Payer: MEDICARE

## 2021-05-11 VITALS
SYSTOLIC BLOOD PRESSURE: 136 MMHG | RESPIRATION RATE: 20 BRPM | HEIGHT: 70 IN | OXYGEN SATURATION: 98 % | DIASTOLIC BLOOD PRESSURE: 73 MMHG | WEIGHT: 204 LBS | BODY MASS INDEX: 29.2 KG/M2 | TEMPERATURE: 97.2 F | HEART RATE: 94 BPM

## 2021-05-11 DIAGNOSIS — M48.061 SPINAL STENOSIS, LUMBAR REGION, WITHOUT NEUROGENIC CLAUDICATION: ICD-10-CM

## 2021-05-11 DIAGNOSIS — R92.8 ABNORMAL MAMMOGRAM: ICD-10-CM

## 2021-05-11 DIAGNOSIS — M54.16 RADICULOPATHY, LUMBAR REGION: Primary | ICD-10-CM

## 2021-05-11 DIAGNOSIS — Z79.899 MEDICATION MANAGEMENT: ICD-10-CM

## 2021-05-11 DIAGNOSIS — Q65.89 HIP DYSPLASIA, CONGENITAL: ICD-10-CM

## 2021-05-11 PROCEDURE — 96372 THER/PROPH/DIAG INJ SC/IM: CPT | Performed by: FAMILY MEDICINE

## 2021-05-11 PROCEDURE — G8427 DOCREV CUR MEDS BY ELIG CLIN: HCPCS | Performed by: FAMILY MEDICINE

## 2021-05-11 PROCEDURE — 77065 DX MAMMO INCL CAD UNI: CPT

## 2021-05-11 PROCEDURE — A4648 IMPLANTABLE TISSUE MARKER: HCPCS

## 2021-05-11 PROCEDURE — 88305 TISSUE EXAM BY PATHOLOGIST: CPT

## 2021-05-11 PROCEDURE — G8419 CALC BMI OUT NRM PARAM NOF/U: HCPCS | Performed by: FAMILY MEDICINE

## 2021-05-11 PROCEDURE — 99214 OFFICE O/P EST MOD 30 MIN: CPT | Performed by: FAMILY MEDICINE

## 2021-05-11 PROCEDURE — 76098 X-RAY EXAM SURGICAL SPECIMEN: CPT

## 2021-05-11 PROCEDURE — 4004F PT TOBACCO SCREEN RCVD TLK: CPT | Performed by: FAMILY MEDICINE

## 2021-05-11 PROCEDURE — V2632 POST CHMBR INTRAOCULAR LENS: HCPCS

## 2021-05-11 PROCEDURE — 2500000003 HC RX 250 WO HCPCS

## 2021-05-11 RX ORDER — TIZANIDINE 4 MG/1
4 TABLET ORAL EVERY 8 HOURS PRN
Qty: 30 TABLET | Refills: 0 | Status: SHIPPED
Start: 2021-05-11 | End: 2021-09-16 | Stop reason: SDUPTHER

## 2021-05-11 RX ORDER — ATORVASTATIN CALCIUM 20 MG/1
20 TABLET, FILM COATED ORAL DAILY
Qty: 90 TABLET | Refills: 1 | Status: SHIPPED
Start: 2021-05-11 | End: 2022-02-14

## 2021-05-11 RX ORDER — PREDNISONE 10 MG/1
TABLET ORAL
Qty: 30 TABLET | Refills: 0 | Status: SHIPPED
Start: 2021-05-11 | End: 2021-09-16

## 2021-05-11 RX ORDER — OXYCODONE HYDROCHLORIDE 5 MG/1
5 TABLET ORAL EVERY 6 HOURS PRN
Qty: 12 TABLET | Refills: 0 | Status: SHIPPED
Start: 2021-05-11 | End: 2021-05-18 | Stop reason: SDUPTHER

## 2021-05-11 RX ORDER — TRIAMCINOLONE ACETONIDE 40 MG/ML
40 INJECTION, SUSPENSION INTRA-ARTICULAR; INTRAMUSCULAR ONCE
Status: COMPLETED | OUTPATIENT
Start: 2021-05-11 | End: 2021-05-11

## 2021-05-11 RX ORDER — MELOXICAM 7.5 MG/1
7.5 TABLET ORAL DAILY PRN
Qty: 90 TABLET | Refills: 1 | Status: CANCELLED | OUTPATIENT
Start: 2021-05-11

## 2021-05-11 RX ADMIN — TRIAMCINOLONE ACETONIDE 40 MG: 40 INJECTION, SUSPENSION INTRA-ARTICULAR; INTRAMUSCULAR at 12:00

## 2021-05-11 ASSESSMENT — ENCOUNTER SYMPTOMS: BACK PAIN: 1

## 2021-05-14 ENCOUNTER — TELEPHONE (OUTPATIENT)
Dept: GENERAL RADIOLOGY | Age: 46
End: 2021-05-14

## 2021-05-14 NOTE — TELEPHONE ENCOUNTER
Call to patient today to instruct her that the pathology report from her recent left breast biopsy indicates a benign finding. Instructed that the performing radiologist reviewed her breast images and the pathology results for concordance. Instructed that she should perform monthly self breast exams, have annual mammogram, and follow her physician's recommendations for any follow up care. She states that on approximately 3 occassions, she has had some greenish nipple discharge on the left breast.  She states Dr. Javier Evans  is aware, and said they will discuss it once her breast biopsy results are available. Verbalizes understanding. Pathology report faxed to Dr. Miguel A Joyner.  Electronically signed by Orly Oliver RN, BSN on 5/14/2021 at 3:33 PM

## 2021-05-17 ENCOUNTER — HOSPITAL ENCOUNTER (OUTPATIENT)
Dept: GENERAL RADIOLOGY | Age: 46
Discharge: HOME | End: 2021-05-19
Payer: MEDICARE

## 2021-05-17 ENCOUNTER — HOSPITAL ENCOUNTER (OUTPATIENT)
Dept: MRI IMAGING | Age: 46
Discharge: HOME | End: 2021-05-19
Payer: MEDICARE

## 2021-05-17 DIAGNOSIS — M54.16 RADICULOPATHY, LUMBAR REGION: ICD-10-CM

## 2021-05-17 DIAGNOSIS — Q65.89 HIP DYSPLASIA, CONGENITAL: ICD-10-CM

## 2021-05-17 DIAGNOSIS — M48.061 SPINAL STENOSIS, LUMBAR REGION, WITHOUT NEUROGENIC CLAUDICATION: ICD-10-CM

## 2021-05-17 PROCEDURE — 73502 X-RAY EXAM HIP UNI 2-3 VIEWS: CPT

## 2021-05-17 PROCEDURE — 72148 MRI LUMBAR SPINE W/O DYE: CPT

## 2021-05-18 DIAGNOSIS — M48.061 SPINAL STENOSIS, LUMBAR REGION, WITHOUT NEUROGENIC CLAUDICATION: ICD-10-CM

## 2021-05-18 DIAGNOSIS — M54.16 RADICULOPATHY, LUMBAR REGION: ICD-10-CM

## 2021-05-18 RX ORDER — OXYCODONE HYDROCHLORIDE 5 MG/1
5 TABLET ORAL EVERY 6 HOURS PRN
Qty: 12 TABLET | Refills: 0 | Status: SHIPPED
Start: 2021-05-18 | End: 2021-05-27 | Stop reason: SDUPTHER

## 2021-05-27 ENCOUNTER — OFFICE VISIT (OUTPATIENT)
Dept: FAMILY MEDICINE CLINIC | Age: 46
End: 2021-05-27
Payer: MEDICARE

## 2021-05-27 VITALS
DIASTOLIC BLOOD PRESSURE: 67 MMHG | BODY MASS INDEX: 28.63 KG/M2 | HEART RATE: 67 BPM | HEIGHT: 70 IN | TEMPERATURE: 97.1 F | RESPIRATION RATE: 16 BRPM | OXYGEN SATURATION: 97 % | WEIGHT: 200 LBS | SYSTOLIC BLOOD PRESSURE: 123 MMHG

## 2021-05-27 DIAGNOSIS — M48.061 SPINAL STENOSIS, LUMBAR REGION, WITHOUT NEUROGENIC CLAUDICATION: Primary | ICD-10-CM

## 2021-05-27 DIAGNOSIS — E11.9 CONTROLLED TYPE 2 DIABETES MELLITUS WITHOUT COMPLICATION, WITHOUT LONG-TERM CURRENT USE OF INSULIN (HCC): ICD-10-CM

## 2021-05-27 DIAGNOSIS — M54.16 RADICULOPATHY, LUMBAR REGION: ICD-10-CM

## 2021-05-27 PROCEDURE — 4004F PT TOBACCO SCREEN RCVD TLK: CPT | Performed by: FAMILY MEDICINE

## 2021-05-27 PROCEDURE — 99213 OFFICE O/P EST LOW 20 MIN: CPT | Performed by: FAMILY MEDICINE

## 2021-05-27 PROCEDURE — G8419 CALC BMI OUT NRM PARAM NOF/U: HCPCS | Performed by: FAMILY MEDICINE

## 2021-05-27 PROCEDURE — G8427 DOCREV CUR MEDS BY ELIG CLIN: HCPCS | Performed by: FAMILY MEDICINE

## 2021-05-27 PROCEDURE — 3046F HEMOGLOBIN A1C LEVEL >9.0%: CPT | Performed by: FAMILY MEDICINE

## 2021-05-27 PROCEDURE — 2022F DILAT RTA XM EVC RTNOPTHY: CPT | Performed by: FAMILY MEDICINE

## 2021-05-27 RX ORDER — OXYCODONE HYDROCHLORIDE 5 MG/1
5 TABLET ORAL EVERY 6 HOURS PRN
Qty: 28 TABLET | Refills: 0 | Status: SHIPPED | OUTPATIENT
Start: 2021-05-27 | End: 2021-06-03

## 2021-05-27 SDOH — ECONOMIC STABILITY: FOOD INSECURITY: WITHIN THE PAST 12 MONTHS, YOU WORRIED THAT YOUR FOOD WOULD RUN OUT BEFORE YOU GOT MONEY TO BUY MORE.: NEVER TRUE

## 2021-05-27 ASSESSMENT — SOCIAL DETERMINANTS OF HEALTH (SDOH): HOW HARD IS IT FOR YOU TO PAY FOR THE VERY BASICS LIKE FOOD, HOUSING, MEDICAL CARE, AND HEATING?: NOT HARD AT ALL

## 2021-05-27 ASSESSMENT — ENCOUNTER SYMPTOMS: BACK PAIN: 1

## 2021-05-27 NOTE — PROGRESS NOTES
Plantar fasciitis     Dr Mccracken Poplin Pulmonary nodules     Cleared by Dr Miranda Porter; stable nodules    Thyroid cyst     biopsied in 2011, but report was unsatisfactory     Type II or unspecified type diabetes mellitus without mention of complication, not stated as uncontrolled          PE:  VS:  /67   Pulse 67   Temp 97.1 °F (36.2 °C) (Temporal)   Resp 16   Ht 5' 10\" (1.778 m)   Wt 200 lb (90.7 kg)   LMP  (LMP Unknown)   SpO2 97%   BMI 28.70 kg/m²   Physical Exam  Vitals reviewed. Constitutional:       General: She is not in acute distress. Appearance: Normal appearance. Cardiovascular:      Rate and Rhythm: Normal rate and regular rhythm. Pulses: Normal pulses. Heart sounds: No murmur heard. Pulmonary:      Breath sounds: Normal breath sounds. No wheezing, rhonchi or rales. Abdominal:      General: Bowel sounds are normal. There is no distension. Tenderness: There is no abdominal tenderness. Musculoskeletal:      Lumbar back: Tenderness (Left-sided lumbar paravertebral muscles) present. No spasms or bony tenderness. Decreased range of motion. Left hip: No tenderness or bony tenderness. Normal range of motion. Normal strength. Left knee: Normal range of motion. No tenderness. Neurological:      Mental Status: She is alert. Assessment (including specific orders/meds/labs):      Luis Miguel Greene was seen today for results. Diagnoses and all orders for this visit:    Spinal stenosis, lumbar region, without neurogenic claudication  -     oxyCODONE (ROXICODONE) 5 MG immediate release tablet; Take 1 tablet by mouth every 6 hours as needed for Pain for up to 7 days. Intended supply: 7 days. Take lowest dose possible to manage pain    Radiculopathy, lumbar region  -     oxyCODONE (ROXICODONE) 5 MG immediate release tablet; Take 1 tablet by mouth every 6 hours as needed for Pain for up to 7 days. Intended supply: 7 days.  Take lowest dose possible to manage software. Although effort is taken to assure the accuracy of this document, it is possible that grammatical, syntax,  or spelling errors may occur.       Electronically signed by Audelia Ureña MD [facility]

## 2021-08-12 ENCOUNTER — CARE COORDINATION (OUTPATIENT)
Dept: CARE COORDINATION | Age: 46
End: 2021-08-12

## 2021-08-12 NOTE — CARE COORDINATION
Called patient to discuss Care Coordination Program. I left a voice mail message introducing myself and a brief description of the Care Coordination Program.   Requested a call back to discuss the program, enrollment, and schedule a time to speak with me. Direct contact information given. Will send introduction letter via [phone]

## 2021-08-12 NOTE — LETTER
8/12/2021    [address] [facility]      Dear Reba Goodman    My name is Jesus Sorensen, RN and I am an Ambulatory Care Manager who partners with Dr Daryl Lucero to improve patients' health. I've been trying to reach you via phone to let you know that, as a member of your care team, I will work with other providers involved in your care, offer education for your specific health conditions, and connect you with more resources as needed. This program is designed to provide you with the opportunity to have a [facility] partner with you for the following situations:     1) if you come home from the hospital or emergency room   2) to help manage your disease   3) when you would like assistance coordinating services or appointments    This added support is provided at no additional cost to you. My primary focus is to help you achieve specific goals and improve your health. Please call me at [phone] to further discuss how I can support your health care needs.     Sincerely,    DOMINICK PerryN, RN, [employer]

## 2021-09-14 LAB
A/G RATIO: 1.2 RATIO (ref 1.1–2.2)
ALBUMIN SERPL-MCNC: 4.2 G/DL (ref 3.4–4.8)
ALP BLD-CCNC: 111 U/L (ref 42–121)
ALT SERPL-CCNC: 12 U/L (ref 10–54)
ANION GAP SERPL CALCULATED.3IONS-SCNC: 11 MEQ/L (ref 3–11)
AST SERPL-CCNC: 16 U/L (ref 10–41)
BASOPHILS ABSOLUTE: 0 K/UL (ref 0–0.2)
BASOPHILS RELATIVE PERCENT: 0.8 % (ref 0–1.5)
BILIRUB SERPL-MCNC: 0.3 MG/DL (ref 0.3–1.5)
BUN BLDV-MCNC: 14 MG/DL (ref 8–21)
CALCIUM SERPL-MCNC: 9.4 MG/DL (ref 8.5–10.5)
CHLORIDE BLD-SCNC: 103 MEQ/L (ref 98–107)
CHOLESTEROL: 175 MG/DL (ref 140–200)
CO2: 28 MEQ/L (ref 21–31)
CREAT SERPL-MCNC: 1.2 MG/DL (ref 0.4–1)
CREATININE + EGFR PANEL: 58 ML/MIN
EOSINOPHILS ABSOLUTE: 0 K/UL (ref 0–0.33)
EOSINOPHILS RELATIVE PERCENT: 0.5 % (ref 0–3)
GFR NON-AFRICAN AMERICAN: 48 ML/MIN
GLOBULIN: 3.5 G/DL (ref 1.9–3.9)
GLUCOSE BLD-MCNC: 108 MG/DL (ref 70–99)
HCT VFR BLD CALC: 42.4 % (ref 36–44)
HDLC SERPL-MCNC: 76 MG/DL (ref 35–85)
HEMOGLOBIN: 14 G/DL (ref 12–15)
LDL CHOLESTEROL: 79 MG/DL
LDL/HDL RATIO: 1 RATIO
LYMPHOCYTES ABSOLUTE: 0.9 K/UL (ref 1.1–4.8)
LYMPHOCYTES RELATIVE PERCENT: 20.7 % (ref 24–44)
MCH RBC QN AUTO: 31.1 PG (ref 28–34)
MCHC RBC AUTO-ENTMCNC: 33 G/DL (ref 33–37)
MCV RBC AUTO: 94.1 FL (ref 80–100)
MONOCYTES ABSOLUTE: 0.8 K/UL (ref 0.2–0.7)
MONOCYTES RELATIVE PERCENT: 18.9 % (ref 3.4–9)
NEUTROPHILS ABSOLUTE: 2.6 K/UL (ref 1.83–8.7)
PDW BLD-RTO: 14.4 % (ref 10.9–14.3)
PLATELET # BLD: 179 K/UL (ref 150–450)
PMV BLD AUTO: 8.8 FL (ref 7.4–10.4)
POTASSIUM SERPL-SCNC: 5 MEQ/L (ref 3.6–5)
RBC # BLD: 4.51 M/UL (ref 4–4.9)
SEGMENTED NEUTROPHILS RELATIVE PERCENT: 59.1 % (ref 40–74)
SODIUM BLD-SCNC: 142 MEQ/L (ref 135–145)
TOTAL PROTEIN: 7.7 G/DL (ref 5.9–7.8)
TRIGL SERPL-MCNC: 79 MG/DL (ref 41–189)
WBC # BLD: 4.4 K/UL (ref 4.5–11)

## 2021-09-15 DIAGNOSIS — G89.4 CHRONIC PAIN SYNDROME: ICD-10-CM

## 2021-09-15 LAB
ESTIMATED AVERAGE GLUCOSE: 126 MG/DL
HBA1C MFR BLD: 6 % (ref 4–6)

## 2021-09-16 ENCOUNTER — OFFICE VISIT (OUTPATIENT)
Dept: FAMILY MEDICINE CLINIC | Age: 46
End: 2021-09-16
Payer: MEDICARE

## 2021-09-16 VITALS
WEIGHT: 187 LBS | TEMPERATURE: 97.2 F | SYSTOLIC BLOOD PRESSURE: 112 MMHG | DIASTOLIC BLOOD PRESSURE: 76 MMHG | OXYGEN SATURATION: 98 % | HEIGHT: 70 IN | HEART RATE: 84 BPM | BODY MASS INDEX: 26.77 KG/M2 | RESPIRATION RATE: 20 BRPM

## 2021-09-16 DIAGNOSIS — E78.2 MIXED HYPERLIPIDEMIA: ICD-10-CM

## 2021-09-16 DIAGNOSIS — M48.061 SPINAL STENOSIS, LUMBAR REGION, WITHOUT NEUROGENIC CLAUDICATION: ICD-10-CM

## 2021-09-16 DIAGNOSIS — M54.16 RADICULOPATHY, LUMBAR REGION: ICD-10-CM

## 2021-09-16 DIAGNOSIS — S66.811A RUPTURE OF FLEXOR TENDON OF RIGHT HAND, INITIAL ENCOUNTER: Primary | ICD-10-CM

## 2021-09-16 PROCEDURE — G8427 DOCREV CUR MEDS BY ELIG CLIN: HCPCS | Performed by: FAMILY MEDICINE

## 2021-09-16 PROCEDURE — 99214 OFFICE O/P EST MOD 30 MIN: CPT | Performed by: FAMILY MEDICINE

## 2021-09-16 PROCEDURE — 4004F PT TOBACCO SCREEN RCVD TLK: CPT | Performed by: FAMILY MEDICINE

## 2021-09-16 PROCEDURE — G8419 CALC BMI OUT NRM PARAM NOF/U: HCPCS | Performed by: FAMILY MEDICINE

## 2021-09-16 RX ORDER — TIZANIDINE 4 MG/1
4 TABLET ORAL EVERY 8 HOURS PRN
Qty: 30 TABLET | Refills: 0 | Status: SHIPPED
Start: 2021-09-16 | End: 2021-12-09 | Stop reason: SDUPTHER

## 2021-09-16 RX ORDER — GABAPENTIN 600 MG/1
600 TABLET ORAL 3 TIMES DAILY
Qty: 90 TABLET | Refills: 2 | Status: SHIPPED
Start: 2021-09-16 | End: 2022-01-12

## 2021-09-16 RX ORDER — MELOXICAM 15 MG/1
15 TABLET ORAL DAILY PRN
Qty: 90 TABLET | Refills: 1 | Status: SHIPPED
Start: 2021-09-16 | End: 2021-12-09

## 2021-09-16 RX ORDER — GABAPENTIN 400 MG/1
CAPSULE ORAL
Qty: 120 CAPSULE | Refills: 2 | Status: CANCELLED | OUTPATIENT
Start: 2021-09-16 | End: 2021-12-15

## 2021-09-16 RX ORDER — GABAPENTIN 400 MG/1
CAPSULE ORAL
Qty: 120 CAPSULE | Refills: 2 | OUTPATIENT
Start: 2021-09-16 | End: 2021-12-15

## 2021-09-16 ASSESSMENT — ENCOUNTER SYMPTOMS
BACK PAIN: 1
SHORTNESS OF BREATH: 0
ABDOMINAL PAIN: 0

## 2021-09-16 NOTE — PROGRESS NOTES
affective disorder ([address] [facility] [address]     [facility] in [facility] Cervical radiculopathy     De Quervain's tenosynovitis, left     Diabetic neuropathy ([address]     Displacement of cervical intervertebral disc without myelopathy     Displacement of lumbar intervertebral disc without myelopathy     Headache(784.0)     Liver cyst     Long term (current) use of opiate analgesic     [facility]    Lumbar radiculopathy     Plantar fasciitis     Dr Kenneth Noe Pulmonary nodules     Cleared by Dr Nan Snell; stable nodules    Thyroid cyst     biopsied in 2011, but report was unsatisfactory     Type II or unspecified type diabetes mellitus without mention of complication, not stated as uncontrolled          PE:  VS:  /76   Pulse 84   Temp 97.2 °F (36.2 °C) (Temporal)   Resp 20   Ht 5' 10\" (1.778 m)   Wt 187 lb (84.8 kg)   LMP  (LMP Unknown)   SpO2 98%   BMI 26.83 kg/m²   Physical Exam  Vitals reviewed. Constitutional:       General: She is not in acute distress. Appearance: Normal appearance. Eyes:      Conjunctiva/sclera: Conjunctivae normal.   Cardiovascular:      Rate and Rhythm: Normal rate and regular rhythm. Pulses: Normal pulses. Heart sounds: No murmur heard. Pulmonary:      Effort: Pulmonary effort is normal. No respiratory distress. Breath sounds: Normal breath sounds. Abdominal:      General: Bowel sounds are normal. There is no distension. Palpations: Abdomen is soft. Tenderness: There is no abdominal tenderness. Musculoskeletal:      Lumbar back: Tenderness present. Decreased range of motion. Positive right straight leg raise test and positive left straight leg raise test.      Comments: Right hand with decreased flexion of distal thumb at the distal phalangeal joint. There is also pain on passive range of motion at the carpal metacarpal joint.   Unable to complete Aníbal Grooms due to pain  Left hand with tenderness to palpation uncontrolled. Counseled regarding the possible sideeffects, risks, benefits and alternatives to treatment; patient and/or guardian verbalizes understanding, agrees, feels comfortable with and wishes to proceed with above treatment plan. Call or go to ED immediately if symptoms worsen or persist. Advised patient to call with any new medication issues, and, as applicable, read all Rx info from pharmacy to assure aware ofall possible risks and side effects of medication before taking. As applicable, educational materials and/or home exercises printed forpatient's review and were included in patient instructions on his/her After Visit Summary and given to patient at the end of visit. Patient and/or guardian given opportunity to ask questions/raise concerns. She verbalized comfort and understanding of instructions. Follow Up:     Return in about 8 weeks (around 11/9/2021), or if symptoms worsen or fail to improve, for recheck of hand pain, back pain . or sooner if the above issues change unexpectedly or new issues develop     This document was prepared at least partially through the use of[employer] recognition software. Although effort is taken to assure the accuracy of this document, it is possible that grammatical, syntax,  or spelling errors may occur.       Electronically signed by Terry Sales MD [facility]

## 2021-11-02 ENCOUNTER — OFFICE VISIT (OUTPATIENT)
Dept: ORTHOPEDIC SURGERY | Age: 46
End: 2021-11-02
Payer: MEDICARE

## 2021-11-02 ENCOUNTER — TELEPHONE (OUTPATIENT)
Dept: ORTHOPEDIC SURGERY | Age: 46
End: 2021-11-02

## 2021-11-02 VITALS — BODY MASS INDEX: 27.3 KG/M2 | WEIGHT: 195 LBS | HEIGHT: 71 IN | RESPIRATION RATE: 20 BRPM

## 2021-11-02 DIAGNOSIS — M65.311 TRIGGER FINGER OF RIGHT THUMB: Primary | ICD-10-CM

## 2021-11-02 DIAGNOSIS — M79.642 LEFT HAND PAIN: ICD-10-CM

## 2021-11-02 DIAGNOSIS — M79.641 RIGHT HAND PAIN: Primary | ICD-10-CM

## 2021-11-02 DIAGNOSIS — G56.03 BILATERAL CARPAL TUNNEL SYNDROME: ICD-10-CM

## 2021-11-02 PROCEDURE — 4004F PT TOBACCO SCREEN RCVD TLK: CPT | Performed by: ORTHOPAEDIC SURGERY

## 2021-11-02 PROCEDURE — G8484 FLU IMMUNIZE NO ADMIN: HCPCS | Performed by: ORTHOPAEDIC SURGERY

## 2021-11-02 PROCEDURE — 20526 THER INJECTION CARP TUNNEL: CPT | Performed by: ORTHOPAEDIC SURGERY

## 2021-11-02 PROCEDURE — 99204 OFFICE O/P NEW MOD 45 MIN: CPT | Performed by: ORTHOPAEDIC SURGERY

## 2021-11-02 PROCEDURE — G8427 DOCREV CUR MEDS BY ELIG CLIN: HCPCS | Performed by: ORTHOPAEDIC SURGERY

## 2021-11-02 PROCEDURE — G8419 CALC BMI OUT NRM PARAM NOF/U: HCPCS | Performed by: ORTHOPAEDIC SURGERY

## 2021-11-02 PROCEDURE — 20550 NJX 1 TENDON SHEATH/LIGAMENT: CPT | Performed by: ORTHOPAEDIC SURGERY

## 2021-11-02 RX ORDER — BETAMETHASONE SODIUM PHOSPHATE AND BETAMETHASONE ACETATE 3; 3 MG/ML; MG/ML
18 INJECTION, SUSPENSION INTRA-ARTICULAR; INTRALESIONAL; INTRAMUSCULAR; SOFT TISSUE ONCE
Status: COMPLETED | OUTPATIENT
Start: 2021-11-02 | End: 2021-11-02

## 2021-11-02 RX ADMIN — BETAMETHASONE SODIUM PHOSPHATE AND BETAMETHASONE ACETATE 18 MG: 3; 3 INJECTION, SUSPENSION INTRA-ARTICULAR; INTRALESIONAL; INTRAMUSCULAR; SOFT TISSUE at 15:00

## 2021-11-02 NOTE — PROGRESS NOTES
Department of Orthopedic Surgery  History and Physical      CHIEF COMPLAINT:  Painful stiff right thumb, pain to the left middle and index digits, bilateral hand numbness and tingling     HISTORY OF PRESENT ILLNESS:                The patient is a 48 y.o. female who presents with the above CC. In regards to her right thumb, she has had a long ongoing history of right thumb issues that began with pain and stiffness, transitioned to clickling and locking, and eventually became locked in an extended position within the last month. To her right middle and index finger, she is beginning to have similar stiffness as her thumb started. In addition, she has had episodes of numbness in her hands that causes her to wake up from sleeping. She has had an EMG in 2019 that as consistent with CTS bilaterally.      Past Medical History:        Diagnosis Date    Ankle fracture, right 2013    hardware insertion; fracture after a fall from 40 feet     Bipolar affective disorder (HCC)     [facility] Cervical radiculopathy     De Quervain's tenosynovitis, left     Diabetic neuropathy ([facility] [address]     Displacement of cervical intervertebral disc without myelopathy     Displacement of lumbar intervertebral disc without myelopathy     Headache(784.0)     Liver cyst     Long term (current) use of opiate analgesic     [facility]    Lumbar radiculopathy     Plantar fasciitis     Dr Jenny Patirck Pulmonary nodules     Cleared by Dr Wilmer Shea; stable nodules    Thyroid cyst     biopsied in 2011, but report was unsatisfactory     Type II or unspecified type diabetes mellitus without mention of complication, not stated as uncontrolled      Past Surgical History:        Procedure Laterality Date    ANKLE FRACTURE SURGERY Right 8/2013    [address] SURGERY  10/16/2017    PLIF L4-5 L5-s1    CHOLECYSTECTOMY      COLONOSCOPY  7/2010    Dr Danna Ortiz; normal except for hemorrhoids [address], 2004, 2005,2013    podaitrist; panatar fasciits, heel spuirs, tendon lengthening.  [facility]ICOLE [facility] LOC BREAST BIOPSY LEFT Left 5/11/2021    NICOLE STEROTACTIC LOC BREAST BIOPSY LEFT 5/11/2021 [facility]    TONSILLECTOMY AND ADENOIDECTOMY      TUBAL LIGATION       Current Medications:   No current facility-administered medications for this visit. Allergies:  Acetaminophen, Hydrocodone, and Vicodin [hydrocodone-acetaminophen]    Social History:   TOBACCO:   reports that she has been smoking. She has a 20.00 pack-year smoking history. She has never used smokeless tobacco.  ETOH:   reports no history of alcohol use. DRUGS:   reports no history of drug use. ACTIVITIES OF DAILY LIVING:    OCCUPATION:    Family History:       Problem Relation Age of Onset    Arthritis Mother     Thyroid Disease Mother     Stroke Mother         factor V leiden     Cancer Mother         melanoma    Rheum Arthritis Mother     Diabetes Father     Stroke Sister         factor V leiden     Rheum Arthritis Sister     Cancer Paternal Grandfather         melanoma       REVIEW OF SYSTEMS:  CONSTITUTIONAL:  negative  HEENT:  negative  RESPIRATORY:  negative  CARDIOVASCULAR:  negative  MUSCULOSKELETAL: See HPI  NEUROLOGICAL:  negative  BEHAVIOR/PSYCH:  negative    PHYSICAL EXAM:    VITALS:  Resp 20   Ht 5' 11\" (1.803 m)   Wt 195 lb (88.5 kg)   LMP  (LMP Unknown)   BMI 27.20 kg/m²   CONSTITUTIONAL:  awake, alert, cooperative, no apparent distress, and appears stated age  EYES:  sclera clear, conjunctiva normal  ENT:  Normocephalic, without obvious abnormality, atraumatic  NECK:  Supple, symmetrical, trachea midline,   LUNGS:  No increased effort of breathing  CARDIOVASCULAR:  2+ radial pulses, extremities warm and well perfused  ABDOMEN:  Nondistended  CHEST:  Atraumatic   GENITAL/URINARY:  deferred  NEUROLOGIC:  Awake, alert, oriented to name, place and time.   Cranial nerves II-XII are grossly intact. Motor is 5 out of 5 bilaterally. Sensory is intact.  gait is normal.  MUSCULOSKELETAL:    RUE: Painful palpable nodule at the A1 pulley with limited ability to flex at the IPJ. Full flexion and extension at the MCP. Negative Tinel's, Juanis's at the carpal tunnel or cubital tunnel. Negative spurling. Strength 5/5 to entire upper extremity. Hand warm and perfused with 2+ radial pulse    LUE: Palpable nodules at the thumb and ring fingers with crepitus, however no tenderness or locking. Negative Kathleen's at the carpal tunnel or cubital tunnel, negative Juanis's at the cubital tunnel. Negative Spurling's. Strength 5/5 to entire upper extremity. Hand warm and perfused with 2+ radial pulse    DATA:    CBC:   Lab Results   Component Value Date    WBC 4.4 09/14/2021    RBC 4.51 09/14/2021    HGB 14.0 09/14/2021    HCT 42.4 09/14/2021    MCV 94.1 09/14/2021    MCH 31.1 09/14/2021    MCHC 33.0 09/14/2021    RDW 14.4 09/14/2021     09/14/2021    MPV 8.8 09/14/2021     PT/INR:    Lab Results   Component Value Date    PROTIME 11.4 10/10/2017    INR 1.0 10/10/2017       Radiology Review:  3 views bilateral hand PA, oblique and lateral taken and reviewed today with the patient demonstrating some mild degenerative changes the PIPJs, no acute fractures or dislocations. Impression: Osteoarthritis of PIPJs of bilateral hands    Patient had an EMG/NCS dated 2/13/19 performed by Dr. Judie Quiles in [place]:    Right median nerve motor latency: 4.5  Left median nerve motor latency: 4.8  Right ulnar nerve velocity: 56  Left ulnar nerve velocity: 67  Right median nerve sensory latency: 4.3   Left median nerve sensory latency: 4.8    EMG portion of the exam demonstrates Increase insertional activity and diminished recruitment of the left APB.  All remaining muscles showed no evidence of electrical instability      IMPRESSION:  · Bilateral carpal tunnel syndrome  · Right Grade IV trigger thumb  · Presymptomatic trigger fingers to the right ring and thumb digits    PLAN:  Today we will provide the pt with injections to the bilateral carpal tunnels as well as right trigger thumb. She will come back before her trip to [facility] to assess her response. Surgical interventions was discussed if her injections fail to provide any relief. Procedure Note Right Carpal Tunnel Injection    The right carpal tunnel was identified as the injection site. The risk and benefits of a cortisone injection were explained and the patient consented to the injection. Under sterile conditions, the carpal canal was injected with a mixture of 1 mL of 1% Lidocaine and 1 mL of 6 mg/mL Betamethasone without complication. A sterile bandage was applied. Procedure Note Left Carpal Tunnel Injection    The left carpal tunnel was identified as the injection site. The risk and benefits of a cortisone injection were explained and the patient consented to the injection. Under sterile conditions, the carpal canal was injected with a mixture of 1 mL of 1% Lidocaine and 1 mL of 6 mg/mL Betamethasone without complication. A sterile bandage was applied. Procedure Note Trigger Finger Injection    The right Thumb trigger nodule was identified as the injection site. The risk and benefits of a cortisone injection were explained and the patient consented to the injection. Under sterile conditions, the digit was injected with a mixture of 1 mL of 1% Lidocaine and 1 mL of 6 mg/mL Betamethasone without complication. A sterile bandage was applied. I have seen and evaluated the patient and agree with the above assessment and plan on today's visit. I have performed the key components of the history and physical examination with significant findings of right locked trigger thumb. Patient does have a history of carpal tunnel syndrome confirmed by EMG nerve conduction today which were reviewed today in the office. She has minimal neck symptoms.   Discussed diagnostic cortisone injection for bilateral carpal tunnels as well as an injection of the right thumb. She does well she may benefit from surgical intervention. All questions answered. I concur with the findings and plan as documented.     Amanda Hudson MD  11/2/2021

## 2021-11-02 NOTE — PATIENT INSTRUCTIONS
Patient Education        Carpal Tunnel Syndrome: Care Instructions  Overview     Carpal tunnel syndrome is numbness, tingling, weakness, and pain in your hand, wrist, and sometimes forearm. It is caused by pressure on the median nerve. This nerve and several tough tissues called tendons run through a space in the wrist. This space is called the carpal tunnel. The repeated hand motions used in work and some hobbies and sports can put pressure on the median nerve. Pregnancy can cause carpal tunnel syndrome. Several conditions, such as diabetes, arthritis, and an underactive thyroid, can also cause it. You may be able to limit an activity or change the way you do it to reduce your symptoms. You also can take other steps to feel better. If your symptoms are mild, 1 to 2 weeks of home treatment are likely to ease your pain. Surgery is needed only if other treatments do not work. Follow-up care is a key part of your treatment and safety. Be sure to make and go to all appointments, and call your doctor if you are having problems. It's also a good idea to know your test results and keep a list of the medicines you take. How can you care for yourself at home? · If possible, stop or reduce the activity that causes your symptoms. If you cannot stop the activity, take frequent breaks to rest and stretch or change hand positions to do a task. Try switching hands, such as when using a computer mouse. · Try to avoid bending or twisting your wrists. · Ask your doctor if you can take an over-the-counter pain medicine, such as acetaminophen (Tylenol), ibuprofen (Advil, Motrin), or naproxen (Aleve). Be safe with medicines. Read and follow all instructions on the label. · If your doctor prescribes corticosteroid medicine to help reduce pain and swelling, take it exactly as prescribed. Call your doctor if you think you are having a problem with your medicine.   · Put ice or a cold pack on your wrist for 10 to 20 minutes at a time to ease pain. Put a thin cloth between the ice and your skin. · If your doctor or your physical or occupational therapist tells you to wear a wrist splint, wear it as directed to keep your wrist in a neutral position. This also eases pressure on your median nerve. · Ask your doctor whether you should have physical or occupational therapy to learn how to do tasks differently. · Try a yoga class to stretch your muscles and build strength in your hands and wrists. Yoga has been shown to ease carpal tunnel symptoms. To prevent carpal tunnel  · When working at a computer, keep your hands and wrists in line with your forearms. Hold your elbows close to your sides. Take a break every 10 to 15 minutes. · Try these exercises:  ? Warm up: Rotate your wrist up, down, and from side to side. Repeat this 4 times. Stretch your fingers far apart, relax them, then stretch them again. Repeat 4 times. Stretch your thumb by pulling it back gently, holding it, and then releasing it. Repeat 4 times. ? Prayer stretch: Start with your palms together in front of your chest just below your chin. Slowly lower your hands toward your waistline while keeping your hands close to your stomach and your palms together until you feel a mild to moderate stretch under your forearms. Hold for 10 to 20 seconds. Repeat 4 times. ? Wrist flexor stretch: Hold your arm in front of you with your palm up. Bend your wrist, pointing your hand toward the floor. With your other hand, gently bend your wrist further until you feel a mild to moderate stretch in your forearm. Hold for 10 to 20 seconds. Repeat 4 times. ? Wrist extensor stretch: Repeat the steps for the wrist flexor stretch, but begin with your extended hand palm down. · Squeeze a rubber exercise ball several times a day to keep your hands and fingers strong. · Avoid holding objects (such as a book) in one position for a long time. When possible, use your whole hand to grasp an object.  Using just the thumb and index finger can put stress on the wrist.  · Do not smoke. It can make this condition worse by reducing blood flow to the median nerve. If you need help quitting, talk to your doctor about stop-smoking programs and medicines. These can increase your chances of quitting for good. When should you call for help? Watch closely for changes in your health, and be sure to contact your doctor if:    · Your pain or other problems do not get better with home care.     · You want more information about physical or occupational therapy.     · You have side effects of your corticosteroid medicine, such as:  ? Weight gain. ? Mood changes. ? Trouble sleeping. ? Bruising easily.     · You have any other problems with your medicine. Where can you learn more? Go to https://Flash Valet.Department of Health and Human Services. org and sign in to your [employer] account. Enter R432 in the [employer] box to learn more about \"Carpal Tunnel Syndrome: Care Instructions. \"     If you do not have an account, please click on the \"Sign Up Now\" link. Current as of: July 1, 2021               Content Version: 13.0  © 6021-3921 [employer]. Care instructions adapted under license by [facility] ([facility]). If you have questions about a medical condition or this instruction, always ask your healthcare professional. [place] [address] any warranty or liability for your use of this information. Patient Education        Carpal Tunnel Syndrome: Exercises  Introduction  Here are some examples of exercises for you to try. The exercises may be suggested for a condition or for rehabilitation. Start each exercise slowly. Ease off the exercises if you start to have pain. You will be told when to start these exercises and which ones will work best for you. Warm-up stretches  When you no longer have pain or numbness, you can do exercises to help prevent carpal tunnel syndrome from coming back.  Do not do any stretch or movement that is uncomfortable or painful. 1. Rotate your wrist up, down, and from side to side. Repeat 4 times. 2. Stretch your fingers far apart. Relax them, and then stretch them again. Repeat 4 times. 3. Stretch your thumb by pulling it back gently, holding it, and then releasing it. Repeat 4 times. How to do the exercises  Prayer stretch    1. Start with your palms together in front of your chest just below your chin. 2. Slowly lower your hands toward your waistline, keeping your hands close to your stomach and your palms together until you feel a mild to moderate stretch under your forearms. 3. Hold for at least 15 to 30 seconds. Repeat 2 to 4 times. Wrist flexor stretch    1. Extend your arm in front of you with your palm up. 2. Bend your wrist, pointing your hand toward the floor. 3. With your other hand, gently bend your wrist farther until you feel a mild to moderate stretch in your forearm. 4. Hold for at least 15 to 30 seconds. Repeat 2 to 4 times. Wrist extensor stretch    1. Repeat steps 1 through 4 of the stretch above, but begin with your extended hand palm down. Follow-up care is a key part of your treatment and safety. Be sure to make and go to all appointments, and call your doctor if you are having problems. It's also a good idea to know your test results and keep a list of the medicines you take. Where can you learn more? Go to https://VTL Grouppesaqibeb.[employer] org and sign in to your [employer] account. Enter B398 in the [facility] box to learn more about \"Carpal Tunnel Syndrome: Exercises. \"     If you do not have an account, please click on the \"Sign Up Now\" link. Current as of: July 1, 2021               Content Version: 13.0  © 6266-4043 Health[employer] Inc[employer]. Care instructions adapted under license by [facility] ([facility]).  If you have questions about a medical condition or this instruction, always ask your healthcare professional. [address] any warranty or liability for your use of this information. Patient Education        Trigger Finger: Care Instructions  Overview  A trigger finger is a finger stuck in a bent position. It happens when the tendon that bends and straightens the thumb or finger can't slide smoothly under the ligaments that hold the tendon against the bones. In most cases, it's caused by a bump (nodule) that forms on the tendon. The bent finger usually straightens out on its own. A trigger finger can be painful. But it normally isn't a serious problem. Trigger fingers seem to occur more in some groups of people. These groups include:  · People who have diabetes or arthritis. · People who have injured their hands in the past.  · Musicians. · People who  tools often. Rest and exercises may help your finger relax so that it can bend. You may get a corticosteroid shot. This can reduce swelling and pain. Your doctor may put a splint on your finger. It will give your finger some rest. You may need surgery if the finger keeps locking in a bent position. Follow-up care is a key part of your treatment and safety. Be sure to make and go to all appointments, and call your doctor if you are having problems. It's also a good idea to know your test results and keep a list of the medicines you take. How can you care for yourself at home? · If your doctor put a splint on your finger, wear it as directed. Don't take it off until your doctor says you can. · You may need to change your activities to avoid movements that irritate the finger. · Take your medicines exactly as prescribed. Call your doctor if you think you are having a problem with your medicine. · Ask your doctor if you can take an over-the-counter pain medicine, such as acetaminophen (Tylenol), ibuprofen (Advil, Motrin), or naproxen (Aleve). Be safe with medicines. Read and follow all instructions on the label.   · If your doctor recommends exercises, do them as directed. When should you call for help? Call your doctor now or seek immediate medical care if:    · Your finger locks in a bent position and will not straighten. Watch closely for changes in your health, and be sure to contact your doctor if:    · You do not get better as expected. Where can you learn more? Go to https://chpepiceweb.[employer]. org and sign in to your [employer] account. Enter M826 in the [employer] box to learn more about \"Trigger Finger: Care Instructions. \"     If you do not have an account, please click on the \"Sign Up Now\" link. Current as of: July 1, 2021               Content Version: 13.0  © 2006-2021 Healthwise, Incorporated. Care instructions adapted under license by [facility] ([facility]). If you have questions about a medical condition or this instruction, always ask your healthcare professional. [address] any warranty or liability for your use of this information.

## 2021-11-10 ENCOUNTER — CARE COORDINATION (OUTPATIENT)
Dept: CARE COORDINATION | Age: 46
End: 2021-11-10

## 2021-11-10 NOTE — CARE COORDINATION
Care Coordination Services explained to patient. Patient verbalizes understanding, but declines at this time, denies any needs currently. Patient encouraged to call PCP office with any questions/concerns/updates. ACM confirmed pt has ACM's contact information if any needs arise in the future.

## 2021-12-09 ENCOUNTER — VIRTUAL VISIT (OUTPATIENT)
Dept: FAMILY MEDICINE CLINIC | Age: 46
End: 2021-12-09
Payer: MEDICARE

## 2021-12-09 DIAGNOSIS — M65.311 TRIGGER FINGER OF RIGHT THUMB: ICD-10-CM

## 2021-12-09 DIAGNOSIS — M48.061 SPINAL STENOSIS, LUMBAR REGION, WITHOUT NEUROGENIC CLAUDICATION: ICD-10-CM

## 2021-12-09 DIAGNOSIS — M54.16 RADICULOPATHY, LUMBAR REGION: Primary | ICD-10-CM

## 2021-12-09 PROCEDURE — G8427 DOCREV CUR MEDS BY ELIG CLIN: HCPCS | Performed by: FAMILY MEDICINE

## 2021-12-09 PROCEDURE — 99213 OFFICE O/P EST LOW 20 MIN: CPT | Performed by: FAMILY MEDICINE

## 2021-12-09 RX ORDER — TIZANIDINE 4 MG/1
4 TABLET ORAL EVERY 8 HOURS PRN
Qty: 90 TABLET | Refills: 5 | Status: SHIPPED
Start: 2021-12-09 | End: 2022-10-12 | Stop reason: SDUPTHER

## 2021-12-09 NOTE — PROGRESS NOTES
2021    TELEHEALTH EVALUATION -- Audio/Visual (During [id number] public health emergency)    HPI:    Annette Wills (:  1975) has requested an audio/video evaluation. Consent obtained per MA note, with attention to limitations inherent to a video visit for the following concern(s):    Hand pains  Ortho hand surgeon evaluated patient, she has felt to have a trigger thumb. Steroid injection has restored normal function of her right thumb. She is very pleased with this progress    She underwent bilateral carpal tunnel injections as well. Doing well with that. Hand pain is manageable, range of motion is normal. Issues are not limiting her daily functioning    Lumbar disc disease, lumbar stenosis follow-up  Dose of gabapentin was increased at last visit as was dose of meloxicam  She is finding the gabapentin has helped some, but may be not sufficiently. Meloxicam increase has not helped  Asking for alternatives including tramadol  Continues to try to function normally without restriction  Denies any new symptoms. Denies leg weakness or bowel or bladder dysfunction    Review of Systems    Prior to Visit Medications    Medication Sig Taking? Authorizing Provider   tiZANidine (ZANAFLEX) 4 MG tablet Take 1 tablet by mouth every 8 hours as needed (muscle spasm) Yes Chris Ayoub MD   flurbiprofen (ANSAID) 100 MG tablet Take 1 tablet by mouth 2 times daily Yes Chris Ayoub MD   Thumb Spica MISC by Does not apply route Yes Chris Ayoub MD   gabapentin (NEURONTIN) 600 MG tablet Take 1 tablet by mouth 3 times daily for 90 days. Yes Chris Ayoub MD   atorvastatin (LIPITOR) 20 MG tablet Take 1 tablet by mouth daily Yes Chris Ayoub MD   amphetamine-dextroamphetamine (ADDERALL) 20 MG tablet Take 1 tablet by mouth 2 times daily.  Yes Historical Provider, MD   traZODone (DESYREL) 50 MG tablet Take 1 tablet by mouth daily Yes Historical Provider, MD   lamoTRIgine (LAMICTAL) 100 MG tablet Take 300 mg by mouth nightly  Yes Historical Provider, MD   sertraline (ZOLOFT) 100 MG tablet Take 200 mg by mouth nightly  Yes Historical Provider, MD   Thumb Spica MISC by Does not apply route  Angel Hernandez MD       Social History     Tobacco Use    Smoking status: Current Every Day Smoker     Packs/day: 1.00     Years: 20.00     Pack years: 20.00     Last attempt to quit: 2017     Years since quittin.3    Smokeless tobacco: Never Used   Substance Use Topics    Alcohol use: No    Drug use: No     Comment: wants patch to quit            PHYSICAL EXAMINATION:  Vital Signs: (As obtained by patient/caregiver or practitioner observation)    Physical Exam  Vitals reviewed. Constitutional:       General: She is not in acute distress. Appearance: Normal appearance. Musculoskeletal:      Comments: Right thumb with physiologic range of motion demonstrated   Neurological:      Mental Status: She is alert. Other pertinent observable physical exam findings-     Due to this being a TeleHealth encounter, evaluation of the following organ systems is limited: Vitals/Constitutional/EENT/Resp/CV/GI//MS/Neuro/Skin/Heme-Lymph-Imm. ASSESSMENT/PLAN:  Pacheco Calderon was seen today for back pain and hand pain. Diagnoses and all orders for this visit:    Radiculopathy, lumbar region  -     tiZANidine (ZANAFLEX) 4 MG tablet; Take 1 tablet by mouth every 8 hours as needed (muscle spasm)  -     flurbiprofen (ANSAID) 100 MG tablet; Take 1 tablet by mouth 2 times daily    Spinal stenosis, lumbar region, without neurogenic claudication  -     tiZANidine (ZANAFLEX) 4 MG tablet; Take 1 tablet by mouth every 8 hours as needed (muscle spasm)  -     flurbiprofen (ANSAID) 100 MG tablet; Take 1 tablet by mouth 2 times daily    Trigger finger of right thumb      Plan    Follow-up with orthopedics per their instruction. Hand issues have improved    Ongoing back issues. Discontinue meloxicam, trial of different NSAID.  Side effect profile discussed. She wants to discuss tramadol, I indicated that I would only do this if she was seen in the office. She is agreeable to the office follow-up as below. Continue gabapentin as it has been helping her    Return for 3 to 4 weeks in the office to discuss pain in back. An  electronic signature was used to authenticate this note. --Dede Reese MD on 12/9/2021 at 3:41 PM        Pursuant to the emergency declaration under the [address] [place] [address], [facility] waiver authority and the [employer] Act, this Virtual  Visit was conducted, with patient's consent, to reduce the patient's risk of exposure to COVID-19 and provide continuity of care for an established patient. Services were provided through a video synchronous discussion virtually to substitute for in-person clinic visit.

## 2021-12-09 NOTE — PROGRESS NOTES
TeleMedicine Patient Consent    This visit was performed as a virtual video visit using a synchronous, two-way, audio-video telehealth technology platform. Patient identification was verified at the start of the visit, including the patient's telephone number and physical location. I discussed with the patient the nature of our telehealth visits, that:     1. Due to the nature of an audio- video modality, the only components of a physical exam that could be done are the elements supported by direct observation. 2. The provider will evaluate the patient and recommend diagnostics and treatments based on their assessment. 3. If it was felt that the patient should be evaluated in clinic or an emergency room setting, then they would be directed there. 4. Our sessions are not being recorded and that personal health information is protected. 5. Our team would provide follow up care in person if/when the patient needs it. Patient does agree to proceed with telemedicine consultation. Patient location: home address in [facility]    Physician location: regular office location    This visit was completed virtually using Doxy. me    This visit was performed during the 3732 public health crisis and COVID-19 pandemic.   *Add 95 modifier to all Video Visits*

## 2022-02-13 DIAGNOSIS — Z79.899 MEDICATION MANAGEMENT: ICD-10-CM

## 2022-02-14 RX ORDER — ATORVASTATIN CALCIUM 20 MG/1
20 TABLET, FILM COATED ORAL DAILY
Qty: 90 TABLET | Refills: 1 | Status: SHIPPED | OUTPATIENT
Start: 2022-02-14

## 2022-02-14 NOTE — TELEPHONE ENCOUNTER
Last Appointment   12/9/2021  Next Appointment  Visit date not found    *message sent to patient to schedule appt

## 2022-03-17 ENCOUNTER — APPOINTMENT (OUTPATIENT)
Dept: GENERAL RADIOLOGY | Age: 47
End: 2022-03-17
Payer: MEDICARE

## 2022-03-17 ENCOUNTER — HOSPITAL ENCOUNTER (EMERGENCY)
Age: 47
Discharge: HOME | End: 2022-03-18
Attending: STUDENT IN AN ORGANIZED HEALTH CARE EDUCATION/TRAINING PROGRAM
Payer: MEDICARE

## 2022-03-17 DIAGNOSIS — S92.511A CLOSED DISPLACED FRACTURE OF PROXIMAL PHALANX OF LESSER TOE OF RIGHT FOOT, INITIAL ENCOUNTER: ICD-10-CM

## 2022-03-17 DIAGNOSIS — S92.911A CLOSED FRACTURE OF MULTIPLE PHALANGES OF TOE OF RIGHT FOOT, INITIAL ENCOUNTER: Primary | ICD-10-CM

## 2022-03-17 PROCEDURE — 99285 EMERGENCY DEPT VISIT HI MDM: CPT

## 2022-03-17 PROCEDURE — 73630 X-RAY EXAM OF FOOT: CPT

## 2022-03-17 PROCEDURE — 6370000000 HC RX 637 (ALT 250 FOR IP): Performed by: STUDENT IN AN ORGANIZED HEALTH CARE EDUCATION/TRAINING PROGRAM

## 2022-03-17 PROCEDURE — 71046 X-RAY EXAM CHEST 2 VIEWS: CPT

## 2022-03-17 RX ORDER — OXYCODONE HYDROCHLORIDE 5 MG/1
5 TABLET ORAL EVERY 6 HOURS PRN
Qty: 12 TABLET | Refills: 0 | Status: SHIPPED | OUTPATIENT
Start: 2022-03-17 | End: 2022-03-20

## 2022-03-17 RX ORDER — FENTANYL CITRATE 50 UG/ML
50 INJECTION, SOLUTION INTRAMUSCULAR; INTRAVENOUS ONCE
Status: DISCONTINUED | OUTPATIENT
Start: 2022-03-17 | End: 2022-03-18 | Stop reason: HOSPADM

## 2022-03-17 RX ORDER — OXYCODONE HYDROCHLORIDE 5 MG/1
5 TABLET ORAL ONCE
Status: COMPLETED | OUTPATIENT
Start: 2022-03-17 | End: 2022-03-17

## 2022-03-17 RX ADMIN — OXYCODONE 5 MG: 5 TABLET ORAL at 22:16

## 2022-03-17 ASSESSMENT — PAIN - FUNCTIONAL ASSESSMENT
PAIN_FUNCTIONAL_ASSESSMENT: PREVENTS OR INTERFERES SOME ACTIVE ACTIVITIES AND ADLS
PAIN_FUNCTIONAL_ASSESSMENT: 0-10

## 2022-03-17 ASSESSMENT — PAIN SCALES - GENERAL
PAINLEVEL_OUTOF10: 9
PAINLEVEL_OUTOF10: 10

## 2022-03-17 ASSESSMENT — PAIN DESCRIPTION - DESCRIPTORS: DESCRIPTORS: THROBBING

## 2022-03-17 ASSESSMENT — PAIN DESCRIPTION - LOCATION: LOCATION: TOE (COMMENT WHICH ONE)

## 2022-03-17 ASSESSMENT — PAIN DESCRIPTION - FREQUENCY: FREQUENCY: CONTINUOUS

## 2022-03-17 ASSESSMENT — PAIN DESCRIPTION - PAIN TYPE: TYPE: ACUTE PAIN

## 2022-03-17 ASSESSMENT — PAIN DESCRIPTION - ONSET: ONSET: SUDDEN

## 2022-03-17 ASSESSMENT — PAIN DESCRIPTION - ORIENTATION: ORIENTATION: RIGHT

## 2022-03-17 NOTE — ED NOTES
Department of Emergency Medicine  FIRST PROVIDER TRIAGE NOTE             Independent MLP           3/17/22  7:57 PM EDT    Date of Encounter: 3/17/22   MRN: [id number]      HPI: Claudene Frohlich is a 48 y.o. female who presents to the ED for Foot Injury (bruised right foot and toes, pt states she stood up, got dizzy, and fell, denies head injury)  Patient reports that approximately 2 PM this afternoon, she stood up too quickly and blacked out which caused her to injure her for lesser toes on her right foot. Patient states that she not hit her head or sustain additional injuries. Patient states that she would not like to be worked up for the syncopal episode and she would only like to have her toes evaluated and have a consult for pain management. Patient states that she does have a previous history over her right ankle which is fused together at this point. Patient states that she cannot weight-bear at all without difficulty. Patient states she has not taken anything for pain at this time. ROS: Negative for cp, sob, abd pain, back pain, fever, cough, vomiting, diarrhea, rash, headache or dizziness. PE: Gen Appearance/Constitutional: alert  Neck: supple  CV: tachycardia  Pulm: CTA bilat  GI: soft and NT  Musculoskeletal: Unable to move for lesser digits on right foot due to pain and injury    Vitals:    03/17/22 1954   BP: 136/87   Pulse: 105   Resp: 16   Temp: 97.6 °F (36.4 °C)   SpO2: 98%           Initial Plan of Care: All treatment areas with department are currently occupied. Plan to order/Initiate the following while awaiting opening in ED: labs, EKG and imaging studies.   Initiate Treatment-Testing, Proceed toTreatment Area When Bed Available for ED Attending/MLP to Continue Care    Electronically signed by TIO Barraza   DD: 3/17/22         Alexander Barraza  03/17/22 3147

## 2022-03-18 VITALS
BODY MASS INDEX: 28.63 KG/M2 | SYSTOLIC BLOOD PRESSURE: 129 MMHG | RESPIRATION RATE: 16 BRPM | WEIGHT: 200 LBS | HEIGHT: 70 IN | DIASTOLIC BLOOD PRESSURE: 80 MMHG | OXYGEN SATURATION: 100 % | TEMPERATURE: 97.6 F | HEART RATE: 84 BPM

## 2022-03-18 PROCEDURE — 6370000000 HC RX 637 (ALT 250 FOR IP): Performed by: STUDENT IN AN ORGANIZED HEALTH CARE EDUCATION/TRAINING PROGRAM

## 2022-03-18 RX ORDER — OXYCODONE HYDROCHLORIDE 5 MG/1
5 TABLET ORAL ONCE
Status: COMPLETED | OUTPATIENT
Start: 2022-03-18 | End: 2022-03-18

## 2022-03-18 RX ADMIN — OXYCODONE HYDROCHLORIDE 5 MG: 5 TABLET ORAL at 00:49

## 2022-03-18 ASSESSMENT — PAIN SCALES - GENERAL: PAINLEVEL_OUTOF10: 4

## 2022-03-18 NOTE — ED PROVIDER NOTES
Cortney Arreguin is a 48 y.o. female with a PMHx significant for spinal stenosis, bipolar, DM who presents for evaluation of right foot injury, beginning prior to arrival.  The complaint has been persistent, moderate in severity, and worsened by movement and palpation. The patient states that prior to arrival she had gotten up from bed. Notes that she stepped over to the side moves too quickly and tripped on her toes. States she saw her toes go the other way and they feel broken. Notes history of fusion of her right ankle in the past.  Patient denies any her head, no LOC. She is not on anticoagulation. The history is provided by the patient and medical records. Review of Systems   Constitutional: Negative for chills and fever. HENT: Negative for ear pain, sinus pressure and sore throat. Eyes: Negative for pain, discharge and redness. Respiratory: Negative for cough, shortness of breath and wheezing. Cardiovascular: Negative for chest pain. Gastrointestinal: Negative for abdominal distention, diarrhea, nausea and vomiting. Genitourinary: Negative for dysuria and frequency. Musculoskeletal: Negative for arthralgias and back pain. Right foot pain   Skin: Positive for color change. Negative for rash and wound. Neurological: Negative for weakness and headaches. Hematological: Negative for adenopathy. All other systems reviewed and are negative. Physical Exam  Vitals and nursing note reviewed. Constitutional:       General: She is in acute distress (Appears uncomfortable). Appearance: Normal appearance. She is well-developed. She is not ill-appearing. HENT:      Head: Normocephalic and atraumatic. Right Ear: External ear normal.      Left Ear: External ear normal.   Eyes:      General:         Right eye: No discharge. Left eye: No discharge. Extraocular Movements: Extraocular movements intact.       Conjunctiva/sclera: Conjunctivae normal. Cardiovascular:      Rate and Rhythm: Normal rate and regular rhythm. Heart sounds: Normal heart sounds. No murmur heard. Pulmonary:      Effort: Pulmonary effort is normal. No respiratory distress. Breath sounds: Normal breath sounds. No stridor. Abdominal:      General: There is no distension. Palpations: Abdomen is soft. There is no mass. Tenderness: There is no abdominal tenderness. Hernia: No hernia is present. Musculoskeletal:         General: Tenderness present. No swelling. Cervical back: Normal range of motion and neck supple. Comments: To palpation of digits of right lower extremity   Skin:     General: Skin is warm and dry. Coloration: Skin is not jaundiced or pale. Findings: Bruising (The digits of right lower extremity see photo below) present. Neurological:      General: No focal deficit present. Mental Status: She is alert. Cranial Nerves: No cranial nerve deficit. Procedures     [facility]     ED Course as of 03/20/22 0343   u Mar 17, 2022   2212 Patient states that she can tolerate oxycodone. [BB]   8871 Spoke with Dr. Donato Nathan with podiatry. He reviewed images. Notes outpatient follow up. Place the patient in post op shoe and crutches. [BB]      ED Course User Index  [BB] DO Luciano Kitchen Warrens presents to the ED for evaluation of discomfort to her right toes. Patient notes she woke up too quickly and stepped on her foot by mistake. Workup in the ED revealed imaging showing minimally displaced fractures of the proximal phalanx of the 3rd through 5th digits, 4th and 5th toe fractures are intra-articular. Patient continues to be non-toxic on re-evaluation. Podiatry was consulted, notes to follow up outpatient. Findings were discussed with the patient and reasons to immediately return to the ED were articulated to them.  They will follow-up with on call the right foot. The 4th and 5th toe fractures are   intra-articular. Regional soft tissue swelling. 2.  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 XR CHEST (2 VW)   Final Result   No acute process. ------------------------- NURSING NOTES AND VITALS REVIEWED ---------------------------  Date / Time Roomed:  3/17/2022  7:56 PM  ED Bed Assignment:  SONG/SONG    The nursing notes within the ED encounter and vital signs as below have been reviewed. /80   Pulse 84   Temp 97.6 °F (36.4 °C) (Temporal)   Resp 16   Ht 5' 10\" (1.778 m)   Wt 200 lb (90.7 kg)   LMP  (LMP Unknown)   SpO2 100%   BMI 28.70 kg/m²   Oxygen Saturation Interpretation: Normal      ------------------------------------------ PROGRESS NOTES ------------------------------------------  3:48 AM EDT  I have spoken with the patient and discussed todays results, in addition to providing specific details for the plan of care and counseling regarding the diagnosis and prognosis. Their questions are answered at this time and they are agreeable with the plan. I discussed at length with them reasons for immediate return here for re evaluation. They will followup with the on call Podiatrist by calling their office on Monday.      --------------------------------- ADDITIONAL PROVIDER NOTES ---------------------------------  At this time the patient is without objective evidence of an acute process requiring hospitalization or inpatient management. They have remained hemodynamically stable throughout their entire ED visit and are stable for discharge with outpatient follow-up. The plan has been discussed in detail and they are aware of the specific conditions for emergent return, as well as the importance of follow-up.       Discharge Medication List as of 3/17/2022 11:57 PM      START taking these medications    Details   oxyCODONE (ROXICODONE) 5 MG immediate release tablet Take 1 tablet by mouth every 6 hours as needed for Pain for up to 3 days. Intended supply: 3 days. Take lowest dose possible to manage pain, Disp-12 tablet, R-0Print             Diagnosis:  1. Closed fracture of multiple phalanges of toe of right foot, initial encounter    2. Closed displaced fracture of proximal phalanx of lesser toe of right foot, initial encounter        Disposition:  Patient's disposition: Discharge to home  Patient's condition is stable.            Jamar Richardson, DO  03/20/22 0854

## 2022-03-18 NOTE — ED NOTES
Pt states she was at home and got up too quickly and became dizzy and fell. Pt states she doesn't know how she landed on her right foot. Pt right foot is deformed and rotating distally. Pt denies any other complaints at this time. Pt states she does not want anything done with an ekg or blood work and just wants what needs to be done to fix her foot. Pt screamed when her toe was touched on the right foot. Pt has hx of diabetes and right ankle fusion.       Nat Mcdermott RN  03/17/22 2009

## 2022-03-18 NOTE — ED NOTES
Pt medicated as ordered before d/c  Pt given d/c instructions and educated on importance of follow up. Pt educated on new medication. Pt verbalized understanding of instructions and readiness for d/c.  Pt walked self ambulatory to waiting room in stable condition to be driven home by her      Chapis Dunbar RN  03/18/22 9924

## 2022-03-20 ASSESSMENT — ENCOUNTER SYMPTOMS
VOMITING: 0
COUGH: 0
BACK PAIN: 0
EYE REDNESS: 0
EYE PAIN: 0
SHORTNESS OF BREATH: 0
SORE THROAT: 0
WHEEZING: 0
ABDOMINAL DISTENTION: 0
EYE DISCHARGE: 0
NAUSEA: 0
SINUS PRESSURE: 0
DIARRHEA: 0
COLOR CHANGE: 1

## 2022-03-21 ENCOUNTER — TELEPHONE (OUTPATIENT)
Dept: FAMILY MEDICINE CLINIC | Age: 47
End: 2022-03-21

## 2022-03-21 NOTE — TELEPHONE ENCOUNTER
Unfortunately I cannot prescribe pain meds (even though she has a fracture) without her being seen.   Offer appt

## 2022-03-21 NOTE — TELEPHONE ENCOUNTER
Patient was seen in the [facility] ED on 3/17 and was only given pain medication through the weekend for four broken toes. Patient will see Danielle Pappas on Wednesday, and would like pain medication prescribed until then.

## 2022-03-24 ENCOUNTER — TELEMEDICINE (OUTPATIENT)
Dept: FAMILY MEDICINE CLINIC | Age: 47
End: 2022-03-24
Payer: MEDICARE

## 2022-03-24 DIAGNOSIS — M54.16 RADICULOPATHY, LUMBAR REGION: ICD-10-CM

## 2022-03-24 DIAGNOSIS — S92.901D CLOSED FRACTURE OF RIGHT FOOT WITH ROUTINE HEALING, SUBSEQUENT ENCOUNTER: Primary | ICD-10-CM

## 2022-03-24 PROCEDURE — 99213 OFFICE O/P EST LOW 20 MIN: CPT | Performed by: STUDENT IN AN ORGANIZED HEALTH CARE EDUCATION/TRAINING PROGRAM

## 2022-03-24 PROCEDURE — G8427 DOCREV CUR MEDS BY ELIG CLIN: HCPCS | Performed by: STUDENT IN AN ORGANIZED HEALTH CARE EDUCATION/TRAINING PROGRAM

## 2022-03-24 RX ORDER — OXYCODONE HYDROCHLORIDE 5 MG/1
5 TABLET ORAL EVERY 8 HOURS PRN
Qty: 21 TABLET | Refills: 0 | Status: SHIPPED | OUTPATIENT
Start: 2022-03-24 | End: 2022-03-31

## 2022-03-24 RX ORDER — METHYLPREDNISOLONE 4 MG/1
TABLET ORAL
COMMUNITY
Start: 2022-03-23 | End: 2022-10-12 | Stop reason: ALTCHOICE

## 2022-03-24 RX ORDER — GABAPENTIN 600 MG/1
600 TABLET ORAL 3 TIMES DAILY
Qty: 90 TABLET | Refills: 2 | Status: SHIPPED
Start: 2022-03-24 | End: 2022-10-12 | Stop reason: SDUPTHER

## 2022-03-24 RX ORDER — LAMOTRIGINE 200 MG/1
200 TABLET ORAL DAILY
COMMUNITY
Start: 2022-02-14

## 2022-03-24 RX ORDER — IBUPROFEN 800 MG/1
800 TABLET ORAL EVERY 8 HOURS PRN
Qty: 120 TABLET | Refills: 0 | Status: CANCELLED | OUTPATIENT
Start: 2022-03-24

## 2022-03-24 NOTE — PROGRESS NOTES
[facility] TeleMedicine Patient Consent    This visit was performed as a virtual video visit using a synchronous, two-way, audio-video telehealth technology platform. Patient identification was verified at the start of the visit, including the patient's telephone number and physical location. I discussed with the patient the nature of our telehealth visits, that:     1. Due to the nature of an audio- video modality, the only components of a physical exam that could be done are the elements supported by direct observation. 2. The provider will evaluate the patient and recommend diagnostics and treatments based on their assessment. 3. If it was felt that the patient should be evaluated in clinic or an emergency room setting, then they would be directed there. 4. Our sessions are not being recorded and that personal health information is protected. 5. Our team would provide follow up care in person if/when the patient needs it. Patient does agree to proceed with telemedicine consultation. Patient location: home address in [facility]    Physician location: regular office location    This visit was completed virtually using Doxy. me    This visit was performed during the 6365 public health crisis and COVID-19 pandemic.   *Add 95 modifier to all Video Visits*

## 2022-03-24 NOTE — PROGRESS NOTES
3/24/2022    TELEHEALTH EVALUATION -- Audio/Visual (During FHTBF-94 public health emergency)    HPI:    Nicholas Cordero (:  1975) has requested an audio/video evaluation for the following concern(s): R foot fracture    She got tangled in her own feet and fell on the floor  Went there ER and found she had fracture  She is in [employer], non-weight bearing for 4-6 weeks  CT foot scan in a week  Saw podiatrist yesterday  Has been doing ice, elevation and aleeve  In the morning its better, through out the day gets worse  7/10 with pain, has help at home  Had oxycodone from ER which helped a lot of pain  Black and blue and swelling is improved    Xray right foot     Minimally displaced fractures of the proximal phalanx of the 3rd through   5th digits of the right foot. The 4th and 5th toe fractures are   intra-articular. Regional soft tissue swelling.       2. 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 Review of Systems   Constitutional: Negative for activity change, appetite change, chills, fatigue and fever. HENT: Negative for congestion, sore throat and trouble swallowing. Eyes: Negative for photophobia, pain and visual disturbance. Respiratory: Negative for cough and shortness of breath. Cardiovascular: Negative for chest pain, palpitations and leg swelling. Gastrointestinal: Negative for abdominal distention, abdominal pain, constipation, diarrhea, nausea and vomiting. Endocrine: Negative for heat intolerance and polydipsia. Genitourinary: Negative for difficulty urinating, dysuria, frequency and hematuria. Musculoskeletal: Positive for gait problem. Negative for joint swelling, neck pain and neck stiffness. Right foot swelling and pain   Skin: Positive for color change (right foot). Negative for rash and wound. Neurological: Negative for dizziness, syncope, weakness, light-headedness, numbness and headaches.    Psychiatric/Behavioral: Negative for agitation, behavioral problems and confusion. Prior to Visit Medications    Medication Sig Taking? Authorizing Provider   gabapentin (NEURONTIN) 600 MG tablet Take 1 tablet by mouth 3 times daily for 90 days. Yes Manish Rodrigez MD   methylPREDNISolone (MEDROL DOSEPACK) 4 MG tablet  Yes Historical Provider, MD   lamoTRIgine (LAMICTAL) 200 MG tablet Take 200 mg by mouth daily  Yes Historical Provider, MD   oxyCODONE (ROXICODONE) 5 MG immediate release tablet Take 1 tablet by mouth every 8 hours as needed for Pain for up to 7 days. Intended supply: 7 days. Take lowest dose possible to manage pain Yes Manish Rodrigez MD   atorvastatin (LIPITOR) 20 MG tablet TAKE 1 TABLET BY MOUTH DAILY Yes Linnea Gupta MD   tiZANidine (ZANAFLEX) 4 MG tablet Take 1 tablet by mouth every 8 hours as needed (muscle spasm) Yes Linnea Gupta MD   amphetamine-dextroamphetamine (ADDERALL) 20 MG tablet Take 1 tablet by mouth 2 times daily.  Yes Historical Provider, MD   traZODone (DESYREL) 50 MG tablet Take 1 tablet by mouth daily Yes Historical Provider, MD   lamoTRIgine (LAMICTAL) 100 MG tablet Take 100 mg by mouth nightly With 200mg tablet Yes Historical Provider, MD   sertraline (ZOLOFT) 100 MG tablet Take 200 mg by mouth nightly  Yes Historical Provider, MD   flurbiprofen (ANSAID) 100 MG tablet Take 1 tablet by mouth 2 times daily  Patient not taking: Reported on 3/24/2022  Linnea Gupta MD   Thumb Spica MISC by Does not apply route  Linnea Gupta MD       Social History     Tobacco Use    Smoking status: Current Every Day Smoker     Packs/day: 1.00     Years: 20.00     Pack years: 20.00     Last attempt to quit: 2017     Years since quittin.6    Smokeless tobacco: Never Used   Substance Use Topics    Alcohol use: No    Drug use: No     Comment: wants patch to quit        Allergies   Allergen Reactions    Acetaminophen     Hydrocodone     Vicodin [Hydrocodone-Acetaminophen] Hives and Other (See Comments) Ringing in ears   ,   Past Medical History:   Diagnosis Date    Ankle fracture, right     hardware insertion; fracture after a fall from 40 feet     Bipolar affective disorder ([facility] [address]     [facility] Cervical radiculopathy     De Quervain's tenosynovitis, left     Diabetic neuropathy ([facility] [address]     Displacement of cervical intervertebral disc without myelopathy     Displacement of lumbar intervertebral disc without myelopathy     Headache(784.0)     Liver cyst     Long term (current) use of opiate analgesic     [facility]    Lumbar radiculopathy     Plantar fasciitis     Dr Herve Johnson Pulmonary nodules     Cleared by Dr Vivek Jolly; stable nodules    Thyroid cyst     biopsied in , but report was unsatisfactory     Type II or unspecified type diabetes mellitus without mention of complication, not stated as uncontrolled    ,   Past Surgical History:   Procedure Laterality Date    ANKLE FRACTURE SURGERY Right 2013    [facility]  10/16/2017    PLIF L4-5 L5-s1    CHOLECYSTECTOMY      COLONOSCOPY  2010    Dr Spring Schmidt; normal except for hemorrhoids    [address], , ,    podaitrist; panatar fasciits, heel spuirs, tendon lengthening.      NICOLE STEROTACTIC LOC BREAST BIOPSY LEFT Left 2021    NICOLE STEROTACTIC LOC BREAST BIOPSY LEFT 2021 [facility]    TONSILLECTOMY AND ADENOIDECTOMY      TUBAL LIGATION     ,   Social History     Tobacco Use    Smoking status: Current Every Day Smoker     Packs/day: 1.00     Years: 20.00     Pack years: 20.00     Last attempt to quit: 2017     Years since quittin.6    Smokeless tobacco: Never Used   Substance Use Topics    Alcohol use: No    Drug use: No     Comment: wants patch to quit   ,   Family History   Problem Relation Age of Onset    Arthritis Mother     Thyroid Disease Mother     Stroke Mother         factor V leiden     Cancer Mother melanoma    Rheum Arthritis Mother     Diabetes Father     Stroke Sister         factor V leiden     Rheum Arthritis Sister     Cancer Paternal Grandfather         melanoma       PHYSICAL EXAMINATION:  [ INSTRUCTIONS:  \"[x]\" Indicates a positive item  \"[]\" Indicates a negative item  -- DELETE ALL ITEMS NOT EXAMINED]  Vital Signs: (As obtained by patient/caregiver or practitioner observation)    Blood pressure-  Heart rate-    Respiratory rate-    Temperature-  Pulse oximetry-     Constitutional: [x] Appears well-developed and well-nourished [] No apparent distress      [] Abnormal-   Mental status  [x] Alert and awake  [x] Oriented to person/place/time []Able to follow commands      Eyes:  EOM    [x]  Normal  [] Abnormal-  Sclera  [x]  Normal  [] Abnormal -         Discharge []  None visible  [] Abnormal -    HENT:   [x] Normocephalic, atraumatic. [] Abnormal   [x] Mouth/Throat: Mucous membranes are moist.     External Ears [x] Normal  [] Abnormal-     Neck: [x] No visualized mass     Pulmonary/Chest: [x] Respiratory effort normal.  [] No visualized signs of difficulty breathing or respiratory distress        [] Abnormal-      Musculoskeletal:   [] Normal gait with no signs of ataxia         [] Normal range of motion of neck        [x] Abnormal- non-weight bearing for now      Neurological:        [x] No Facial Asymmetry (Cranial nerve 7 motor function) (limited exam to video visit)          [] No gaze palsy        [] Abnormal-         Skin:        [x] No significant exanthematous lesions or discoloration noted on facial skin         [] Abnormal-            Psychiatric:       [x] Normal Affect [] No Hallucinations        [] Abnormal-     Other pertinent observable physical exam findings-     ASSESSMENT/PLAN:  1. Closed fracture of right foot with routine healing, subsequent encounter  - follows with podiatry. Has CT foot imagine ordered for coming week. - non-weight bearing for now. Continue RICE therapy. - will get pain meds for a week. - oxyCODONE (ROXICODONE) 5 MG immediate release tablet; Take 1 tablet by mouth every 8 hours as needed for Pain for up to 7 days. Intended supply: 7 days. Take lowest dose possible to manage pain  Dispense: 21 tablet; Refill: 0    2. Radiculopathy, lumbar region  - refilled. - gabapentin (NEURONTIN) 600 MG tablet; Take 1 tablet by mouth 3 times daily for 90 days. Dispense: 90 tablet; Refill: 2      Leslie Dave, was evaluated through a synchronous (real-time) audio-video encounter. The patient (or guardian if applicable) is aware that this is a billable service, which includes applicable co-pays. This Virtual Visit was conducted with patient's (and/or legal guardian's) consent. The visit was conducted pursuant to the emergency declaration under the [address] [place] [address], [address] [place] [address] authority and the [employer] Act. Patient identification was verified, and a caregiver was present when appropriate. The patient was located at home in a state where the provider was licensed to provide care. Total time spent on this encounter: Not billed by time    --Jeremias Monge MD on 3/25/2022 at 11:42 AM    An electronic signature was used to authenticate this note.

## 2022-03-25 ASSESSMENT — ENCOUNTER SYMPTOMS
VOMITING: 0
ABDOMINAL PAIN: 0
DIARRHEA: 0
TROUBLE SWALLOWING: 0
SHORTNESS OF BREATH: 0
ABDOMINAL DISTENTION: 0
CONSTIPATION: 0
EYE PAIN: 0
COLOR CHANGE: 1
COUGH: 0
SORE THROAT: 0
NAUSEA: 0
PHOTOPHOBIA: 0

## 2022-04-21 ENCOUNTER — APPOINTMENT (OUTPATIENT)
Dept: GENERAL RADIOLOGY | Age: 47
End: 2022-04-21
Payer: MEDICARE

## 2022-04-21 ENCOUNTER — APPOINTMENT (OUTPATIENT)
Dept: CT IMAGING | Age: 47
End: 2022-04-21
Payer: MEDICARE

## 2022-04-21 ENCOUNTER — HOSPITAL ENCOUNTER (EMERGENCY)
Age: 47
Discharge: HOME | End: 2022-04-22
Attending: STUDENT IN AN ORGANIZED HEALTH CARE EDUCATION/TRAINING PROGRAM
Payer: MEDICARE

## 2022-04-21 VITALS
TEMPERATURE: 97.5 F | DIASTOLIC BLOOD PRESSURE: 60 MMHG | HEIGHT: 71 IN | RESPIRATION RATE: 16 BRPM | HEART RATE: 98 BPM | OXYGEN SATURATION: 98 % | BODY MASS INDEX: 28 KG/M2 | SYSTOLIC BLOOD PRESSURE: 130 MMHG | WEIGHT: 200 LBS

## 2022-04-21 DIAGNOSIS — S86.912A STRAIN OF LEFT KNEE AND LEG, INITIAL ENCOUNTER: ICD-10-CM

## 2022-04-21 DIAGNOSIS — S05.12XA ECCHYMOSIS OF LEFT EYE, INITIAL ENCOUNTER: ICD-10-CM

## 2022-04-21 DIAGNOSIS — G89.29 CHRONIC LOW BACK PAIN WITHOUT SCIATICA, UNSPECIFIED BACK PAIN LATERALITY: ICD-10-CM

## 2022-04-21 DIAGNOSIS — E04.1 THYROID NODULE: ICD-10-CM
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
## 2022-04-21 DIAGNOSIS — S05.11XA ECCHYMOSIS OF RIGHT EYE, INITIAL ENCOUNTER: ICD-10-CM

## 2022-04-21 DIAGNOSIS — M54.50 CHRONIC LOW BACK PAIN WITHOUT SCIATICA, UNSPECIFIED BACK PAIN LATERALITY: ICD-10-CM

## 2022-04-21 DIAGNOSIS — S09.90XA CLOSED HEAD INJURY, INITIAL ENCOUNTER: ICD-10-CM

## 2022-04-21 LAB
ALBUMIN SERPL-MCNC: 3.7 G/DL (ref 3.5–5.2)
ALP BLD-CCNC: 140 U/L (ref 35–104)
ALT SERPL-CCNC: 17 U/L (ref 0–32)
ANION GAP SERPL CALCULATED.3IONS-SCNC: 12 MMOL/L (ref 7–16)
AST SERPL-CCNC: 21 U/L (ref 0–31)
BACTERIA: ABNORMAL /HPF
BASOPHILS ABSOLUTE: 0.02 E9/L (ref 0–0.2)
BASOPHILS RELATIVE PERCENT: 0.3 % (ref 0–2)
BILIRUB SERPL-MCNC: <0.2 MG/DL (ref 0–1.2)
BILIRUBIN URINE: ABNORMAL
BLOOD, URINE: NEGATIVE
BUN BLDV-MCNC: 10 MG/DL (ref 6–20)
CALCIUM SERPL-MCNC: 8.7 MG/DL (ref 8.6–10.2)
CHLORIDE BLD-SCNC: 104 MMOL/L (ref 98–107)
CLARITY: ABNORMAL
CO2: 23 MMOL/L (ref 22–29)
COLOR: YELLOW
CREAT SERPL-MCNC: 1.1 MG/DL (ref 0.5–1)
CRYSTALS, UA: ABNORMAL /HPF
EOSINOPHILS ABSOLUTE: 0.12 E9/L (ref 0.05–0.5)
EOSINOPHILS RELATIVE PERCENT: 1.9 % (ref 0–6)
EPITHELIAL CELLS, UA: ABNORMAL /HPF
GFR AFRICAN AMERICAN: >60
GFR NON-AFRICAN AMERICAN: 53 ML/MIN/1.73
GLUCOSE BLD-MCNC: 145 MG/DL (ref 74–99)
GLUCOSE URINE: NEGATIVE MG/DL
HCG, URINE, POC: NEGATIVE
HCT VFR BLD CALC: 37.4 % (ref 34–48)
HEMOGLOBIN: 11.9 G/DL (ref 11.5–15.5)
IMMATURE GRANULOCYTES #: 0.02 E9/L
IMMATURE GRANULOCYTES %: 0.3 % (ref 0–5)
KETONES, URINE: ABNORMAL MG/DL
LEUKOCYTE ESTERASE, URINE: NEGATIVE
LYMPHOCYTES ABSOLUTE: 2.08 E9/L (ref 1.5–4)
LYMPHOCYTES RELATIVE PERCENT: 33.5 % (ref 20–42)
Lab: NORMAL
MCH RBC QN AUTO: 30.7 PG (ref 26–35)
MCHC RBC AUTO-ENTMCNC: 31.8 % (ref 32–34.5)
MCV RBC AUTO: 96.4 FL (ref 80–99.9)
MONOCYTES ABSOLUTE: 0.46 E9/L (ref 0.1–0.95)
MONOCYTES RELATIVE PERCENT: 7.4 % (ref 2–12)
MUCUS: PRESENT /LPF
NEGATIVE QC PASS/FAIL: NORMAL
NEUTROPHILS ABSOLUTE: 3.51 E9/L (ref 1.8–7.3)
NEUTROPHILS RELATIVE PERCENT: 56.6 % (ref 43–80)
NITRITE, URINE: NEGATIVE
PDW BLD-RTO: 13.5 FL (ref 11.5–15)
PH UA: 6 (ref 5–9)
PLATELET # BLD: 228 E9/L (ref 130–450)
PMV BLD AUTO: 10.8 FL (ref 7–12)
POSITIVE QC PASS/FAIL: NORMAL
POTASSIUM REFLEX MAGNESIUM: 3.9 MMOL/L (ref 3.5–5)
PROTEIN UA: ABNORMAL MG/DL
RBC # BLD: 3.88 E12/L (ref 3.5–5.5)
RBC UA: ABNORMAL /HPF (ref 0–2)
SODIUM BLD-SCNC: 139 MMOL/L (ref 132–146)
SPECIFIC GRAVITY UA: >=1.03 (ref 1–1.03)
TOTAL PROTEIN: 6.7 G/DL (ref 6.4–8.3)
TROPONIN, HIGH SENSITIVITY: <6 NG/L (ref 0–9)
UROBILINOGEN, URINE: 0.2 E.U./DL
WBC # BLD: 6.2 E9/L (ref 4.5–11.5)
WBC UA: ABNORMAL /HPF (ref 0–5)
YEAST: PRESENT /HPF

## 2022-04-21 PROCEDURE — 72125 CT NECK SPINE W/O DYE: CPT

## 2022-04-21 PROCEDURE — 90471 IMMUNIZATION ADMIN: CPT | Performed by: PHYSICIAN ASSISTANT

## 2022-04-21 PROCEDURE — 70486 CT MAXILLOFACIAL W/O DYE: CPT

## 2022-04-21 PROCEDURE — 73562 X-RAY EXAM OF KNEE 3: CPT

## 2022-04-21 PROCEDURE — 85025 COMPLETE CBC W/AUTO DIFF WBC: CPT

## 2022-04-21 PROCEDURE — 6370000000 HC RX 637 (ALT 250 FOR IP): Performed by: PHYSICIAN ASSISTANT

## 2022-04-21 PROCEDURE — 36415 COLL VENOUS BLD VENIPUNCTURE: CPT

## 2022-04-21 PROCEDURE — 99285 EMERGENCY DEPT VISIT HI MDM: CPT

## 2022-04-21 PROCEDURE — 96372 THER/PROPH/DIAG INJ SC/IM: CPT

## 2022-04-21 PROCEDURE — 90714 TD VACC NO PRESV 7 YRS+ IM: CPT | Performed by: PHYSICIAN ASSISTANT

## 2022-04-21 PROCEDURE — 71260 CT THORAX DX C+: CPT

## 2022-04-21 PROCEDURE — 80053 COMPREHEN METABOLIC PANEL: CPT

## 2022-04-21 PROCEDURE — 81001 URINALYSIS AUTO W/SCOPE: CPT

## 2022-04-21 PROCEDURE — 74177 CT ABD & PELVIS W/CONTRAST: CPT

## 2022-04-21 PROCEDURE — 2580000003 HC RX 258: Performed by: PHYSICIAN ASSISTANT

## 2022-04-21 PROCEDURE — 93005 ELECTROCARDIOGRAM TRACING: CPT | Performed by: PHYSICIAN ASSISTANT

## 2022-04-21 PROCEDURE — 70450 CT HEAD/BRAIN W/O DYE: CPT

## 2022-04-21 PROCEDURE — 6360000004 HC RX CONTRAST MEDICATION: Performed by: RADIOLOGY

## 2022-04-21 PROCEDURE — 84484 ASSAY OF TROPONIN QUANT: CPT

## 2022-04-21 PROCEDURE — 72131 CT LUMBAR SPINE W/O DYE: CPT

## 2022-04-21 PROCEDURE — 6360000002 HC RX W HCPCS: Performed by: PHYSICIAN ASSISTANT

## 2022-04-21 PROCEDURE — 73590 X-RAY EXAM OF LOWER LEG: CPT

## 2022-04-21 PROCEDURE — 2580000003 HC RX 258: Performed by: STUDENT IN AN ORGANIZED HEALTH CARE EDUCATION/TRAINING PROGRAM

## 2022-04-21 PROCEDURE — 72128 CT CHEST SPINE W/O DYE: CPT

## 2022-04-21 RX ORDER — 0.9 % SODIUM CHLORIDE 0.9 %
1000 INTRAVENOUS SOLUTION INTRAVENOUS ONCE
Status: COMPLETED | OUTPATIENT
Start: 2022-04-21 | End: 2022-04-21

## 2022-04-21 RX ORDER — TETANUS AND DIPHTHERIA TOXOIDS ADSORBED 2; 2 [LF]/.5ML; [LF]/.5ML
0.5 INJECTION INTRAMUSCULAR ONCE
Status: COMPLETED | OUTPATIENT
Start: 2022-04-21 | End: 2022-04-21

## 2022-04-21 RX ORDER — OXYCODONE HYDROCHLORIDE 5 MG/1
2.5 TABLET ORAL ONCE
Status: COMPLETED | OUTPATIENT
Start: 2022-04-21 | End: 2022-04-21

## 2022-04-21 RX ORDER — 0.9 % SODIUM CHLORIDE 0.9 %
1000 INTRAVENOUS SOLUTION INTRAVENOUS ONCE
Status: COMPLETED | OUTPATIENT
Start: 2022-04-21 | End: 2022-04-22

## 2022-04-21 RX ADMIN — SODIUM CHLORIDE 1000 ML: 9 INJECTION, SOLUTION INTRAVENOUS at 22:17

## 2022-04-21 RX ADMIN — IOPAMIDOL 75 ML: 755 INJECTION, SOLUTION INTRAVENOUS at 21:11

## 2022-04-21 RX ADMIN — OXYCODONE 2.5 MG: 5 TABLET ORAL at 21:21

## 2022-04-21 RX ADMIN — SODIUM CHLORIDE 1000 ML: 9 INJECTION, SOLUTION INTRAVENOUS at 20:05

## 2022-04-21 RX ADMIN — TETANUS AND DIPHTHERIA TOXOIDS ADSORBED 0.5 ML: 2; 2 INJECTION INTRAMUSCULAR at 21:21

## 2022-04-21 ASSESSMENT — VISUAL ACUITY
OS: 20/25
OU: 20/20
OD: 20/25

## 2022-04-21 ASSESSMENT — PAIN SCALES - GENERAL: PAINLEVEL_OUTOF10: 8

## 2022-04-21 NOTE — ED NOTES
Department of Emergency Medicine    FIRST PROVIDER TRIAGE NOTE             Independent MLP           4/21/22  5:52 PM EDT    Date of Encounter: 4/21/22   MRN: [id number]    Vitals:    04/21/22 1753   BP: 130/60   Pulse: 98   Resp: 16   Temp: 97.5 °F (36.4 °C)   TempSrc: Temporal   SpO2: 99%   Weight: 200 lb (90.7 kg)   Height: 5' 11\" (1.803 m)      HPI: Natasha Comer is a 48 y.o. female who presents to the ED for Fall (Idelle Sails down 15 stairs, denies LOC, no thinners, hit head, facial bruising  ), Knee Pain (left), Back Pain (lower ), and Neck Pain  Patient has bruising to both orbits  She states she did hit her face on the steps  Patient does not take anticoagulation     ROS: Negative for cp or sob. Denies abd pain     Physical Exam:   Gen Appearance/Constitutional: alert  HEENT: NC/NT. PERRLA,  Airway patent. No hyphema or subconjunctival hemorrhages, b/l orbital bruising   Musculoskeletal: pain and swelling to left knee      Initial Plan of Care: All treatment areas with department are currently occupied. Plan to order/Initiate the following while awaiting opening in ED: imaging studies.     Initial Plan of Care: Initiate Treatment-Testing, Proceed toTreatment Area When Bed Available for ED Attending/MLP to Continue Care    Electronically signed by Cande Cheng PA-C   DD: 4/21/22       Cande Cheng PA-C  04/21/22 9970

## 2022-04-22 ENCOUNTER — APPOINTMENT (OUTPATIENT)
Dept: GENERAL RADIOLOGY | Age: 47
End: 2022-04-22
Payer: MEDICARE

## 2022-04-22 LAB
EKG ATRIAL RATE: 72 BPM
EKG P AXIS: 55 DEGREES
EKG P-R INTERVAL: 146 MS
EKG Q-T INTERVAL: 428 MS
EKG QRS DURATION: 86 MS
EKG QTC CALCULATION (BAZETT): 468 MS
EKG R AXIS: 39 DEGREES
EKG T AXIS: 43 DEGREES
EKG VENTRICULAR RATE: 72 BPM

## 2022-04-22 PROCEDURE — 93010 ELECTROCARDIOGRAM REPORT: CPT | Performed by: INTERNAL MEDICINE

## 2022-04-22 PROCEDURE — 6370000000 HC RX 637 (ALT 250 FOR IP): Performed by: STUDENT IN AN ORGANIZED HEALTH CARE EDUCATION/TRAINING PROGRAM

## 2022-04-22 RX ORDER — OXYCODONE HYDROCHLORIDE 5 MG/1
5 TABLET ORAL EVERY 6 HOURS PRN
Qty: 12 TABLET | Refills: 0 | Status: SHIPPED | OUTPATIENT
Start: 2022-04-22 | End: 2022-04-25

## 2022-04-22 RX ORDER — OXYCODONE HYDROCHLORIDE 5 MG/1
2.5 TABLET ORAL ONCE
Status: COMPLETED | OUTPATIENT
Start: 2022-04-22 | End: 2022-04-22

## 2022-04-22 RX ADMIN — OXYCODONE 2.5 MG: 5 TABLET ORAL at 01:06

## 2022-04-22 ASSESSMENT — PAIN SCALES - GENERAL: PAINLEVEL_OUTOF10: 8

## 2022-04-22 ASSESSMENT — PAIN DESCRIPTION - LOCATION: LOCATION: FACE;KNEE

## 2022-04-22 NOTE — ED PROVIDER NOTES
ED Attending shared visit  CC: Susu       [facility]  Department of Emergency Medicine   ED  Encounter Note  Admit Date/RoomTime: 2022  6:36 PM  ED Room: 33/33    NAME: Sam Russo  : 1975  MRN: [id number]     Chief Complaint:  Pam Hoyt Hurst down 15 stairs on Tuesday, denies LOC, no thinners, hit head, facial bruising  ), Knee Pain (left), Back Pain (lower ), and Neck Pain    History of Present Illness       Sam Russo is a 48 y.o. old female who presents to the emergency department by private vehicle, for a mechanical fall which occured 2 day(s) prior to arrival. She reportedly was walking down the steps when she missed the landing and fell down 15 stairs while at home prior to incident with complaints of facial pain, dizziness, confusion, numbness to bilateral hands, neck pain, low back pain, L flank pain, and L knee pain. She denies any sxs prior to the fall. The pain has been constant since the fall per the pt. She states that since the fall she has felt a little bit more confused. She states that whenever she stands up she feels dizzy. She also states that she has left knee pain. She denies any previous injury to these areas. The patients tetanus was last updated . Since onset the symptoms have been remaining constant. She denies LOC and denies blood thinner use. Her pain is aggraveated by any movement or any use of and relieved by nothing. She denies any loss of consciousness, chest pain, SOB, hemoptysis, cough, black/bloody stools, blood in urine, dysuria, urinary frequency, urinary urgency, abdominal pain, weakness, tingling, nausea, vomiting, urinary sxs, vaginal bleeding, fever, or chills. She takes no blood thinning agents. She notes she can take oxycodone. She has a PMH of DM and bipolar do. ROS   Pertinent positives and negatives are stated within HPI, all other systems reviewed and are negative.     Past Medical History:  has a past medical history of Ankle fracture, right, Bipolar affective disorder ([facility] [address] Cervical radiculopathy, De Quervain's tenosynovitis, left, Diabetic neuropathy ([address] Displacement of cervical intervertebral disc without myelopathy, Displacement of lumbar intervertebral disc without myelopathy, Headache(784.0), Liver cyst, Long term (current) use of opiate analgesic, Lumbar radiculopathy, Plantar fasciitis, Pulmonary nodules, Thyroid cyst, and Type II or unspecified type diabetes mellitus without mention of complication, not stated as uncontrolled. Surgical History:  has a past surgical history that includes Foot surgery (1997, 1998, 2004, 2005,2013); Cholecystectomy; Tonsillectomy and adenoidectomy; Tubal ligation; Ankle fracture surgery (Right, 8/2013); Colonoscopy (7/2010); back surgery (10/16/2017); and [employer] BREAST BX W LOC DEVICE 1ST LESION LEFT (Left, 5/11/2021). Social History:  reports that she has been smoking. She has a 20.00 pack-year smoking history. She has never used smokeless tobacco. She reports that she does not drink alcohol and does not use drugs. Family History: family history includes Arthritis in her mother; Cancer in her mother and paternal grandfather; Diabetes in her father; Rheum Arthritis in her mother and sister; Stroke in her mother and sister; Thyroid Disease in her mother. Allergies: Acetaminophen, Hydrocodone, and Vicodin [hydrocodone-acetaminophen]    Physical Exam   Oxygen Saturation Interpretation: Normal.        ED Triage Vitals [04/21/22 1753]   BP Temp Temp Source Pulse Resp SpO2 Height Weight   130/60 97.5 °F (36.4 °C) Temporal 98 16 99 % 5' 11\" (1.803 m) 200 lb (90.7 kg)       Physical Exam  Constitutional:  Alert and oriented x 3, development consistent with age. Head:  NC/NT.  no scalp tenderness; TTP under bilateral eyes and nasal bridge  Ears: negative liao sign; external ear canals without erythema or swelling; TMs with good light reflex and no bleeding, bulging, erythema, or swelling. Neurovascular: Motor deficit: none. Strength 5/5 to BLE            Sensory deficit: none. Pulse deficit: none. DP and PT pulses identified with doppler bilaterally            Capillary refill: normal. < 3 seconds bilaterally  Back: no midline cervical or thoracic TTP; no cervical, thoracic, or lumbar step offs or deformities; mild lower lumbar spine left sided and midline TTP. Tenderness: mild TTP to L side of lower lumbar spine and midline lower lumbar spine and mild TTP to L flank. Swelling: no.              Range of Motion: full range of motion. CVA Tenderness: No CVA tenderness. Straight leg raising:  Bilateral negative. Skin:  no wounds, erythema, or swelling. Distal Function:              Motor deficit: none. Strength 5/5 to BLE            Sensory deficit: none. Pulse deficit: none. DP and PT pulses identified with doppler bilaterally            Calf Tenderness:  No Bilateral.  No calf swelling bilaterally             Edema:  none Both lower extremity(s). Gait:  normal.  Extremities: No TTP to BUE; full ROM of BUE; strength 5/5 to BUE; sensation intact to BUE; radial pulses 2+ bilaterally; cap refill < 3 seconds bilaterally  Integument:  Normal turgor. Warm, dry, without visible rash, unless noted elsewhere. Lymphatic: no lymphadenopathy noted  Neurological:  Oriented x3, GCS 15. Motor functions intact.  Strength 5/5 to BUE and BLE; CN 2-12 grossly intact    Lab / Imaging Results   (All laboratory and radiology results have been personally reviewed by myself)  Labs:  Results for orders placed or performed during the hospital encounter of 04/21/22   Troponin   Result Value Ref Range    Troponin, High Sensitivity <6 0 - 9 ng/L   CBC with Auto Differential   Result Value Ref Range    WBC 6.2 4.5 - 11.5 E9/L    RBC 3.88 3.50 - 5.50 E12/L    Hemoglobin 11.9 11.5 - 15.5 g/dL Hematocrit 37.4 34.0 - 48.0 %    MCV 96.4 80.0 - 99.9 fL    MCH 30.7 26.0 - 35.0 pg    MCHC 31.8 (L) 32.0 - 34.5 %    RDW 13.5 11.5 - 15.0 fL    Platelets 070 692 - 692 E9/L    MPV 10.8 7.0 - 12.0 fL    Neutrophils % 56.6 43.0 - 80.0 %    Immature Granulocytes % 0.3 0.0 - 5.0 %    Lymphocytes % 33.5 20.0 - 42.0 %    Monocytes % 7.4 2.0 - 12.0 %    Eosinophils % 1.9 0.0 - 6.0 %    Basophils % 0.3 0.0 - 2.0 %    Neutrophils Absolute 3.51 1.80 - 7.30 E9/L    Immature Granulocytes # 0.02 E9/L    Lymphocytes Absolute 2.08 1.50 - 4.00 E9/L    Monocytes Absolute 0.46 0.10 - 0.95 E9/L    Eosinophils Absolute 0.12 0.05 - 0.50 E9/L    Basophils Absolute 0.02 0.00 - 0.20 E9/L   Comprehensive Metabolic Panel w/ Reflex to MG   Result Value Ref Range    Sodium 139 132 - 146 mmol/L    Potassium reflex Magnesium 3.9 3.5 - 5.0 mmol/L    Chloride 104 98 - 107 mmol/L    CO2 23 22 - 29 mmol/L    Anion Gap 12 7 - 16 mmol/L    Glucose 145 (H) 74 - 99 mg/dL    BUN 10 6 - 20 mg/dL    CREATININE 1.1 (H) 0.5 - 1.0 mg/dL    GFR Non-African American 53 >=60 mL/min/1.73    GFR African American >60     Calcium 8.7 8.6 - 10.2 mg/dL    Total Protein 6.7 6.4 - 8.3 g/dL    Albumin 3.7 3.5 - 5.2 g/dL    Total Bilirubin <0.2 0.0 - 1.2 mg/dL    Alkaline Phosphatase 140 (H) 35 - 104 U/L    ALT 17 0 - 32 U/L    AST 21 0 - 31 U/L   Urinalysis with Microscopic   Result Value Ref Range    Color, UA Yellow Straw/Yellow    Clarity, UA SL CLOUDY Clear    Glucose, Ur Negative Negative mg/dL    Bilirubin Urine SMALL (A) Negative    Ketones, Urine TRACE (A) Negative mg/dL    Specific Gravity, UA >=1.030 1.005 - 1.030    Blood, Urine Negative Negative    pH, UA 6.0 5.0 - 9.0    Protein, UA TRACE Negative mg/dL    Urobilinogen, Urine 0.2 <2.0 E.U./dL    Nitrite, Urine Negative Negative    Leukocyte Esterase, Urine Negative Negative    Mucus, UA Present (A) None Seen /LPF    WBC, UA 5-10 (A) 0 - 5 /HPF    RBC, UA 5-10 (A) 0 - 2 /HPF    Epithelial Cells, UA MANY /HPF Bacteria, UA MODERATE (A) None Seen /HPF    Yeast, UA Present (A) None Seen /HPF    Crystals, UA Moderate (A) None Seen /HPF   POC Pregnancy Urine Qual   Result Value Ref Range    HCG, Urine, POC Negative Negative    Lot Number [id number]     Positive QC Pass/Fail Pass     Negative QC Pass/Fail Pass    EKG 12 Lead   Result Value Ref Range    Ventricular Rate 72 BPM    Atrial Rate 72 BPM    P-R Interval 146 ms    QRS Duration 86 ms    Q-T Interval 428 ms    QTc Calculation (Bazett) 468 ms    P Axis 55 degrees    R Axis 39 degrees    T Axis 43 degrees       Imaging: All Radiology results interpreted by Radiologist unless otherwise noted. CT HEAD WO CONTRAST   Final Result   1. No acute intracranial process. 2.  No fracture or dislocation of cervical spine. 3.  No evidence of facial bone fracture. 4. Hypoattenuating lesion associated with right aspect of the thyroid gland   measures up to 17 cm. RECOMMENDATIONS:   Managing Incidental Thyroid Nodule Detected at CT or MRI or US      1. Further evaluation by thyroid Ultrasound recommended for these incidental   nodules:      Patient Age 25 years or less - Nodule of any size      Patient Age 21-27 years old - Nodule 1 cm in size or greater      Patient Age 28 years or more - Nodule 1.5 cm in size or greater      2. Follow up thyroid ultrasound also recommend in these scenarios      - Solitary nodule with high risk imaging features (locally invasive nodule or   suspicious lymph nodes)      - Heterogeneous, enlarged thyroid gland.      - Increased uptake on PET      3.  NO further imaging is recommended in the following scenarios      - Any nodule not meeting above criteria. - Those patients with limited life expectancy or significant co-morbidities. Note: These recommendations do not apply to pts. w/ increased risk for   thyroid cancer or pts. with symptomatic thyroid disease.       Reference: Recommendations for f/u of Incidental Thyroid Nodules (ITN) found   on CT, MR, NM and Extrathyroidal US are based upon the ACR white paper and   [facility] 3-tiered system for managing ITNs:J Am Gwen Radiol. 2015 Feb;12(2):   143-50         CT CERVICAL SPINE WO CONTRAST   Final Result   1. No acute intracranial process. 2.  No fracture or dislocation of cervical spine. 3.  No evidence of facial bone fracture. 4. Hypoattenuating lesion associated with right aspect of the thyroid gland   measures up to 17 cm. RECOMMENDATIONS:   Managing Incidental Thyroid Nodule Detected at CT or MRI or US      1. Further evaluation by thyroid Ultrasound recommended for these incidental   nodules:      Patient Age 25 years or less - Nodule of any size      Patient Age 21-27 years old - Nodule 1 cm in size or greater      Patient Age 28 years or more - Nodule 1.5 cm in size or greater      2. Follow up thyroid ultrasound also recommend in these scenarios      - Solitary nodule with high risk imaging features (locally invasive nodule or   suspicious lymph nodes)      - Heterogeneous, enlarged thyroid gland.      - Increased uptake on PET      3.  NO further imaging is recommended in the following scenarios      - Any nodule not meeting above criteria. - Those patients with limited life expectancy or significant co-morbidities. Note: These recommendations do not apply to pts. w/ increased risk for   thyroid cancer or pts. with symptomatic thyroid disease. Reference: Recommendations for f/u of Incidental Thyroid Nodules (ITN) found   on CT, MR, NM and Extrathyroidal US are based upon the ACR white paper and   [facility] 3-tiered system for managing ITNs:J Am Gwen Radiol. 2015 Feb;12(2):   143-50         CT THORACIC SPINE WO CONTRAST   Final Result   Unremarkable CT of the thoracic spine. CT LUMBAR SPINE WO CONTRAST   Final Result   1. Stable alignment status post fusion of lumbar spine at L4-S1.   2. No fracture.          CT FACIAL BONES WO CONTRAST Final Result   1. No acute intracranial process. 2.  No fracture or dislocation of cervical spine. 3.  No evidence of facial bone fracture. 4. Hypoattenuating lesion associated with right aspect of the thyroid gland   measures up to 17 cm. RECOMMENDATIONS:   Managing Incidental Thyroid Nodule Detected at CT or MRI or US      1. Further evaluation by thyroid Ultrasound recommended for these incidental   nodules:      Patient Age 25 years or less - Nodule of any size      Patient Age 21-27 years old - Nodule 1 cm in size or greater      Patient Age 28 years or more - Nodule 1.5 cm in size or greater      2. Follow up thyroid ultrasound also recommend in these scenarios      - Solitary nodule with high risk imaging features (locally invasive nodule or   suspicious lymph nodes)      - Heterogeneous, enlarged thyroid gland.      - Increased uptake on PET      3.  NO further imaging is recommended in the following scenarios      - Any nodule not meeting above criteria. - Those patients with limited life expectancy or significant co-morbidities. Note: These recommendations do not apply to pts. w/ increased risk for   thyroid cancer or pts. with symptomatic thyroid disease. Reference: Recommendations for f/u of Incidental Thyroid Nodules (ITN) found   on CT, MR, NM and Extrathyroidal US are based upon the ACR white paper and   [facility] 3-tiered system for managing ITNs:J Am Gwen Radiol. 2015 Feb;12(2):   143-50         CT CHEST W CONTRAST   Final Result   1. No acute process in the chest.      2.  Hypoattenuating lesion associated with right lobe of the thyroid gland. Follow-up recommended as indicated below. RECOMMENDATIONS:   Managing Incidental Thyroid Nodule Detected at CT or MRI or US      1.   Further evaluation by thyroid Ultrasound recommended for these incidental   nodules:      Patient Age 25 years or less - Nodule of any size      Patient Age 21-27 years old - Nodule 1 cm in size or greater      Patient Age 28 years or more - Nodule 1.5 cm in size or greater      2. Follow up thyroid ultrasound also recommend in these scenarios      - Solitary nodule with high risk imaging features (locally invasive nodule or   suspicious lymph nodes)      - Heterogeneous, enlarged thyroid gland.      - Increased uptake on PET      3.  NO further imaging is recommended in the following scenarios      - Any nodule not meeting above criteria. - Those patients with limited life expectancy or significant co-morbidities. Note: These recommendations do not apply to pts. w/ increased risk for   thyroid cancer or pts. with symptomatic thyroid disease. Reference: Recommendations for f/u of Incidental Thyroid Nodules (ITN) found   on CT, MR, NM and Extrathyroidal US are based upon the ACR white paper and   [facility] 3-tiered system for managing ITNs:J Am Gwen Radiol. 2015 Feb;12(2):   143-50         CT ABDOMEN PELVIS W IV CONTRAST Additional Contrast? None   Final Result   No acute process in abdomen or pelvis. XR KNEE LEFT (3 VIEWS)   Final Result   No acute abnormality of the knee. XR TIBIA FIBULA LEFT (2 VIEWS)   Final Result   No acute osseous abnormality. ED Course / Medical Decision Making     Medications   0.9 % sodium chloride bolus (0 mLs IntraVENous Stopped 4/21/22 2216)   oxyCODONE (ROXICODONE) immediate release tablet 2.5 mg (2.5 mg Oral Given 4/21/22 2121)   diptheria-tetanus toxoids [facility]) 2-2 LF/0.5ML injection 0.5 mL (0.5 mLs IntraMUSCular Given 4/21/22 2121)   iopamidol (ISOVUE-370) 76 % injection 75 mL (75 mLs IntraVENous Given 4/21/22 2111)   0.9 % sodium chloride bolus (0 mLs IntraVENous Stopped 4/22/22 0026)   oxyCODONE (ROXICODONE) immediate release tablet 2.5 mg (2.5 mg Oral Given 4/22/22 0106)     EKG: This EKG is signed and interpreted by Dr. Christina Perez.     Rate: 72  Rhythm: Sinus  Interpretation: no acute changes  Comparison: stable as compared to patient's most recent EKG    Consult(s):   None    Procedure(s):  None    MDM:   Pt presents 2 days after a fall down the steps. States she missed the landing and fell down 15 steps. No LOC or blood thinner use. Facial bone TTP, L flank pain, dizziness, neck pain, back pain, bilateral hand numbness, and L knee pain. Given pain meds. Tetanus updated. Labs/imaging ordered and reviewed above. No urinary sxs, UA is a dirty catch. I spoke with Dr. Sierra Roberts about the pt. He will review labs/imaging and decide on disposition at the end of my shift. He is agreeable with this plan. Pt is stable at the end of my shift. Plan of Care/Counseling:  EM Attending Physician reviewed today's visit with the patient in addition to providing specific details for the plan of care and counseling regarding the diagnosis and prognosis. Questions are answered at this time and are agreeable with the plan. Assessment      1. Fall, initial encounter    2. Ecchymosis of left eye, initial encounter    3. Ecchymosis of right eye, initial encounter    4. Closed head injury, initial encounter    5. Strain of left knee and leg, initial encounter    6. Chronic low back pain without sciatica, unspecified back pain laterality    7. Thyroid nodule      Plan   Discharged home. Patient condition is stable    New Medications     Discharge Medication List as of 4/22/2022  2:14 AM      START taking these medications    Details   oxyCODONE (ROXICODONE) 5 MG immediate release tablet Take 1 tablet by mouth every 6 hours as needed for Pain for up to 3 days. Intended supply: 5 days. Take lowest dose possible to manage pain, Disp-12 tablet, R-0Print           Electronically signed by Alena Mcneal PA-C   DD: 4/21/22  **This report was transcribed using voice recognition software. Every effort was made to ensure accuracy; however, inadvertent computerized transcription errors may be present.   END OF ED PROVIDER NOTE     Domingo Hogan PA-C  04/22/22 1412 [facility], [place]  04/22/22 [id number]

## 2022-10-11 NOTE — PROGRESS NOTES
[facility]  Department of Family Medicine  Family Medicine Residency Program      Patient: Gurpreet Yost 50 y.o. female     Date of Service: 10/12/22      Chief complaint:   Chief Complaint   Patient presents with    Back Pain     Times 3 days, Ibuprofen & muscle relaxer not effective         HISTORY OF PRESENTING ILLNESS   46y/o with a complex PMHx notable for spinal stenosis, chronic low back pain, spinal fusion of L4-S1, and bipolar d/o presents with new onset low back pain. Pt states that two days ago she woke up with localized tenderness on her lower back right of her spine. She describes the pain as aching and burning. The following day, the pain spread laterally and she began noticing the pain down her leg. She denies numbness or weakness in her legs/feet. The pain is constant except when she is at rest and is exacerbated with any movement. She tried Ibuprofen and tizanidine for the pain but got little relief. She states that the pain has significantly impaired her ability to complete her daily tasks and she has since been relying on her roommate. She denies recent F/C, CP/SOB, dysuria/hematuria, diabetes/constipation.       Health Maintenance:  Health Maintenance Due   Topic Date Due    COVID-19 Vaccine (1) Never done    Diabetic foot exam  Never done    HIV screen  Never done    Hepatitis C screen  Never done    Hepatitis B vaccine (1 of 3 - Risk 3-dose series) Never done    Cervical cancer screen  Never done    Diabetic retinal exam  08/06/2010    Diabetic microalbuminuria test  10/01/2019    Colorectal Cancer Screen  07/26/2020    Depression Monitoring  11/27/2021    DTaP/Tdap/Td vaccine (1 - Tdap) 04/22/2022    Flu vaccine (1) Never done    A1C test (Diabetic or Prediabetic)  09/14/2022    Lipids  09/14/2022       Past Medical History:      Diagnosis Date    Ankle fracture, right 2013    hardware insertion; fracture after a fall from 40 feet     Bipolar affective disorder ([facility] [address]     [facility] in [facility]     Cervical radiculopathy     De Quervain's tenosynovitis, left     Diabetic neuropathy ([address]     Displacement of cervical intervertebral disc without myelopathy     Displacement of lumbar intervertebral disc without myelopathy     Headache(784.0)     Liver cyst     Long term (current) use of opiate analgesic     Pain Contract with [facility]    Lumbar radiculopathy     Plantar fasciitis     Dr Abdi Do     Pulmonary nodules     Cleared by Dr Lashanda Lo; stable nodules    Thyroid cyst     biopsied in , but report was unsatisfactory     Type II or unspecified type diabetes mellitus without mention of complication, not stated as uncontrolled        Past Surgical History:        Procedure Laterality Date    ANKLE FRACTURE SURGERY Right 2013    [address]  10/16/2017    PLIF L4-5 L5-s1    CHOLECYSTECTOMY      COLONOSCOPY  2010    Dr Sonia Francisco; normal except for hemorrhoids     3 Abhay Lares, , ,    podaitrist; panatar fasciits, heel spuirs, tendon lengthening.      NICOLE STEROTACTIC LOC BREAST BIOPSY LEFT Left 2021    NICOLE STEROTACTIC LOC BREAST BIOPSY LEFT 2021 [facility]    TONSILLECTOMY AND ADENOIDECTOMY      TUBAL LIGATION         Allergies:    Acetaminophen, Hydrocodone, and Vicodin [hydrocodone-acetaminophen]      Social History:   Social History     Socioeconomic History    Marital status: Single     Spouse name: Not on file    Number of children: Not on file    Years of education: Not on file    Highest education level: Not on file   Occupational History    Not on file   Tobacco Use    Smoking status: Every Day     Packs/day: 1.00     Years: 20.00     Pack years: 20.00     Types: Cigarettes     Last attempt to quit: 2017     Years since quittin.2    Smokeless tobacco: Never   Substance and Sexual Activity    Alcohol use: No    Drug use: No     Comment: wants patch to quit    Sexual activity: Not on file   Other Topics Concern    Not on file   Social History Narrative    Not on file     Social Determinants of Health     Financial Resource Strain: Not on file   Food Insecurity: Not on file   Transportation Needs: Not on file   Physical Activity: Not on file   Stress: Not on file   Social Connections: Not on file   Intimate Partner Violence: Not on file   Housing Stability: Not on file        Family History:       Problem Relation Age of Onset    Arthritis Mother     Thyroid Disease Mother     Stroke Mother         factor V leiden     Cancer Mother         melanoma    Rheum Arthritis Mother     Diabetes Father     Stroke Sister         factor V leiden     Rheum Arthritis Sister     Cancer Paternal Grandfather         melanoma       Review of Systems:   Review of Systems   Constitutional:  Positive for activity change. Negative for appetite change, chills and fever. HENT:  Positive for congestion and postnasal drip. Negative for sore throat. Eyes:  Negative for pain and visual disturbance. Respiratory:  Negative for chest tightness and shortness of breath. Cardiovascular:  Negative for chest pain and palpitations. Gastrointestinal:  Negative for abdominal distention, abdominal pain, constipation and diarrhea. Genitourinary:  Negative for dysuria and hematuria. Musculoskeletal:  Positive for back pain and gait problem. Neurological:  Negative for dizziness, numbness and headaches. Psychiatric/Behavioral:  Positive for sleep disturbance (Attributes to pain). PHYSICAL EXAM   Vitals: /78   Pulse 97   Temp 97.3 °F (36.3 °C) (Temporal)   Resp 16   Ht 5' 10\" (1.778 m)   Wt 204 lb 6.4 oz (92.7 kg)   LMP  (LMP Unknown)   SpO2 98%   BMI 29.33 kg/m²   Physical Exam  Constitutional:       General: She is not in acute distress. HENT:      Head: Normocephalic and atraumatic. Nose: Nose normal. No congestion. Eyes:      Extraocular Movements: Extraocular movements intact. Cardiovascular:      Rate and Rhythm: Normal rate and regular rhythm. Pulses: Normal pulses. Heart sounds: Normal heart sounds. Pulmonary:      Effort: Pulmonary effort is normal.      Breath sounds: No stridor. No wheezing. Abdominal:      General: Abdomen is flat. Palpations: Abdomen is soft. Tenderness: There is no abdominal tenderness. There is right CVA tenderness. There is no left CVA tenderness or guarding. Musculoskeletal:         General: Tenderness (palpation right lumbar) present. No swelling. Neurological:      Mental Status: She is alert. Gait: Gait abnormal (walks with limp, slow to stand). Comments: Straight leg raise test positive bilaterally   Psychiatric:         Mood and Affect: Mood normal.         Behavior: Behavior normal.         ASSESSMENT AND PLAN     1. Lumbar back pain  - Monitor for worsening symptoms suggesting urgent evaluation including but not limited to loss of bladder control or worsening pain  - If the agreed upon medication regimen (detailed below) is not sufficient to control her pain, we will consider a short course of opioids  - ibuprofen (ADVIL;MOTRIN) 800 MG tablet; Take 1 tablet by mouth every 8 hours as needed for Pain  Dispense: 90 tablet; Refill: 0  ----- Discussed taking on a scheduled as opposed to an as-needed basis to maximize antiinflammatory effects  - gabapentin (NEURONTIN) 600 MG tablet; Take 1 tablet by mouth 3 times daily for 90 days. Dispense: 90 tablet; Refill: 1  - tiZANidine (ZANAFLEX) 4 MG tablet; Take 1 tablet by mouth every 8 hours as needed (muscle spasm)  Dispense: 90 tablet; Refill: 1  - Lidocaine patch PRN  - XR LUMBAR SPINE (2-3 VIEWS); Future    2. Seasonal allergies  - cetirizine (ZYRTEC) 10 MG tablet; Take 1 tablet by mouth daily  Dispense: 30 tablet; Refill: 6        Counseled regarding above diagnosis, including possible risks and complications, especially if left uncontrolled.  Counseled regarding the possible side effects, risks, benefits and alternatives to treatment; patient and/or guardian verbalizes understanding, agrees, feels comfortable with and wishes to proceed with above treatment plan. Call or go to ED immediately if symptoms worsen or persist. Advised patient to call with any new medication issues, and, as applicable, read all Rx info from pharmacy to assure aware of all possible risks and side effects of medication before taking. Patient and/or guardian given opportunity to ask questions/raise concerns. The patient verbalized comfort and understanding of instructions. I encourage further reading and education about your health conditions. Information on many health conditions is provided by the American Academy of Family Physicians: https://familydoctor. org/  Please bring any questions to me at your next visit. Medication List:    Current Outpatient Medications   Medication Sig Dispense Refill    cetirizine (ZYRTEC) 10 MG tablet Take 1 tablet by mouth daily 30 tablet 11    ibuprofen (ADVIL;MOTRIN) 800 MG tablet Take 1 tablet by mouth every 8 hours as needed for Pain 90 tablet 0    gabapentin (NEURONTIN) 600 MG tablet Take 1 tablet by mouth 3 times daily for 90 days. 90 tablet 1    tiZANidine (ZANAFLEX) 4 MG tablet Take 1 tablet by mouth every 8 hours as needed (muscle spasm) 90 tablet 1    lidocaine (LIDODERM) 5 % Place 1 patch onto the skin daily 12 hours on, 12 hours off. 30 patch 0    lamoTRIgine (LAMICTAL) 200 MG tablet Take 200 mg by mouth daily       atorvastatin (LIPITOR) 20 MG tablet TAKE 1 TABLET BY MOUTH DAILY 90 tablet 1    amphetamine-dextroamphetamine (ADDERALL) 20 MG tablet Take 1 tablet by mouth 2 times daily.       traZODone (DESYREL) 50 MG tablet Take 1 tablet by mouth daily      lamoTRIgine (LAMICTAL) 100 MG tablet Take 100 mg by mouth nightly With 200mg tablet      sertraline (ZOLOFT) 100 MG tablet Take 200 mg by mouth nightly       flurbiprofen (ANSAID) 100 MG tablet Take 1 tablet by mouth 2 times daily (Patient not taking: No sig reported) 60 tablet 5     No current facility-administered medications for this visit. Return to Office: Return in about 1 week (around 10/19/2022) for Back pain, sooner if symptoms don't improve. This document may have been prepared at least partially through the use of voice recognition software. Although effort is taken to assure the accuracy of this document, it is possible that grammatical, syntax,  or spelling errors may occur.     Hugh Navarro MD

## 2022-10-12 ENCOUNTER — OFFICE VISIT (OUTPATIENT)
Dept: FAMILY MEDICINE CLINIC | Age: 47
End: 2022-10-12
Payer: MEDICARE

## 2022-10-12 VITALS
OXYGEN SATURATION: 98 % | HEIGHT: 70 IN | HEART RATE: 97 BPM | RESPIRATION RATE: 16 BRPM | WEIGHT: 204.4 LBS | BODY MASS INDEX: 29.26 KG/M2 | TEMPERATURE: 97.3 F | DIASTOLIC BLOOD PRESSURE: 78 MMHG | SYSTOLIC BLOOD PRESSURE: 110 MMHG

## 2022-10-12 DIAGNOSIS — M54.16 RADICULOPATHY, LUMBAR REGION: ICD-10-CM

## 2022-10-12 DIAGNOSIS — J30.2 SEASONAL ALLERGIES: ICD-10-CM

## 2022-10-12 DIAGNOSIS — M54.50 LUMBAR BACK PAIN: Primary | ICD-10-CM

## 2022-10-12 DIAGNOSIS — M48.061 SPINAL STENOSIS, LUMBAR REGION, WITHOUT NEUROGENIC CLAUDICATION: ICD-10-CM

## 2022-10-12 PROBLEM — N18.30 CHRONIC RENAL DISEASE, STAGE III (HCC): Status: ACTIVE | Noted: 2022-10-12

## 2022-10-12 PROCEDURE — 4004F PT TOBACCO SCREEN RCVD TLK: CPT | Performed by: STUDENT IN AN ORGANIZED HEALTH CARE EDUCATION/TRAINING PROGRAM

## 2022-10-12 PROCEDURE — 99213 OFFICE O/P EST LOW 20 MIN: CPT | Performed by: STUDENT IN AN ORGANIZED HEALTH CARE EDUCATION/TRAINING PROGRAM

## 2022-10-12 PROCEDURE — G8427 DOCREV CUR MEDS BY ELIG CLIN: HCPCS | Performed by: STUDENT IN AN ORGANIZED HEALTH CARE EDUCATION/TRAINING PROGRAM

## 2022-10-12 PROCEDURE — G8484 FLU IMMUNIZE NO ADMIN: HCPCS | Performed by: STUDENT IN AN ORGANIZED HEALTH CARE EDUCATION/TRAINING PROGRAM

## 2022-10-12 PROCEDURE — G8419 CALC BMI OUT NRM PARAM NOF/U: HCPCS | Performed by: STUDENT IN AN ORGANIZED HEALTH CARE EDUCATION/TRAINING PROGRAM

## 2022-10-12 RX ORDER — TIZANIDINE 4 MG/1
4 TABLET ORAL EVERY 8 HOURS PRN
Qty: 90 TABLET | Refills: 1 | Status: SHIPPED | OUTPATIENT
Start: 2022-10-12

## 2022-10-12 RX ORDER — CETIRIZINE HYDROCHLORIDE 10 MG/1
10 TABLET ORAL DAILY
Qty: 30 TABLET | Refills: 11 | Status: SHIPPED | OUTPATIENT
Start: 2022-10-12 | End: 2022-11-11

## 2022-10-12 RX ORDER — IBUPROFEN 800 MG/1
TABLET ORAL
COMMUNITY
Start: 2022-08-22 | End: 2022-10-12

## 2022-10-12 RX ORDER — GABAPENTIN 600 MG/1
600 TABLET ORAL 3 TIMES DAILY
Qty: 90 TABLET | Refills: 1 | Status: SHIPPED | OUTPATIENT
Start: 2022-10-12 | End: 2023-01-10

## 2022-10-12 RX ORDER — IBUPROFEN 800 MG/1
800 TABLET ORAL EVERY 8 HOURS PRN
Qty: 90 TABLET | Refills: 0 | Status: SHIPPED | OUTPATIENT
Start: 2022-10-12 | End: 2022-11-11

## 2022-10-12 RX ORDER — LIDOCAINE 50 MG/G
1 PATCH TOPICAL DAILY
Qty: 30 PATCH | Refills: 0 | Status: SHIPPED | OUTPATIENT
Start: 2022-10-12 | End: 2022-11-11

## 2022-10-12 ASSESSMENT — ENCOUNTER SYMPTOMS
BACK PAIN: 1
SORE THROAT: 0
ABDOMINAL PAIN: 0
EYE PAIN: 0
CONSTIPATION: 0
ABDOMINAL DISTENTION: 0
DIARRHEA: 0
CHEST TIGHTNESS: 0
SHORTNESS OF BREATH: 0

## 2022-10-12 NOTE — PROGRESS NOTES
[facility]  Precepting Note    Subjective:  Acute on chronic back pain  Hx of spinal stenosis  Radiates down leg  Pain improves with rest   Denies distal weakness  Denies urinary or bowel symptoms  No trauma or inciting event   Had CT lumbar spine in April- no hardware malignment    ROS otherwise negative    Past medical, surgical, family and social history were reviewed, non-contributory, and unchanged unless otherwise stated. Objective:    /78   Pulse 97   Temp 97.3 °F (36.3 °C) (Temporal)   Resp 16   Ht 5' 10\" (1.778 m)   Wt 204 lb 6.4 oz (92.7 kg)   LMP  (LMP Unknown)   SpO2 98%   BMI 29.33 kg/m²     Exam is as noted by resident with the following changes, additions or corrections:    General:  NAD; alert & oriented x 3   Heart:  RRR, no murmurs, gallops, or rubs. Lungs:  CTA bilaterally, no wheeze, rales or rhonchi  Abd: bowel sounds present, nontender, nondistended, no masses  Extrem:  No clubbing, cyanosis, or edema  MSK: spine appears symmetric. R paralumbar muscles ttp. + SLR. Normal sensation and pulses. Normal patellar reflexes bilaterally    Assessment/Plan:    Acute low back pain-   Restart Gabapentin and cont Xanaflex  Prn Ibuprofen for short period  Lumbar Xray     Attending Physician Statement  I have reviewed the chart, including any radiology or labs, and have seen the patient with the resident(s). I personally reviewed and performed key elements of the history and exam.  I agree with the assessment, plan and orders as documented by the resident. Please refer to the resident note for additional information.       Electronically signed by Maria A Cote MD on 10/12/2022 at 3:06 PM

## 2022-10-14 ENCOUNTER — TELEPHONE (OUTPATIENT)
Dept: FAMILY MEDICINE CLINIC | Age: 47
End: 2022-10-14

## 2022-10-14 RX ORDER — METHYLPREDNISOLONE 4 MG/1
TABLET ORAL
Qty: 1 KIT | Refills: 0 | Status: SHIPPED | OUTPATIENT
Start: 2022-10-14 | End: 2022-10-20

## 2022-10-14 NOTE — PROGRESS NOTES
Spoke to patient by phone. She states that her pain is now diffuse over her lower back (yesterday it was more localized to the right). She also reports that she has a shooting pain down her left leg now which was not present yesterday. She states that she is having difficulty standing and can not drive today. I spoke to Dr. Jose Tomlin regarding her new pain and we decided to try a Medrol pack. The patient stated yesterday that she has used Medrol packs in the past few this type of pain and got relief. Pt also requested to try Pennsaid (diclofenac cream). Per the EMR, this is not covered by her insurance so a prescription for Voltaren was sent. Pt is very upset and worried that her pain will get worse this weekend when the clinic is closed. We discussed that if she needs to seek medical care over the weekend that she would need to go to the ER. We will still keep our appointment as scheduled for next week. Pt had the opportunity to ask and have all questions answered. She is agreeable to this plan.       Darryle Laughter, MD, PhD  Family Medicine - PGY1

## 2022-10-14 NOTE — TELEPHONE ENCOUNTER
Patient saw Dr Kassandra Patten yesterday for her back pain; he advised her to call if pain got worse. Patient states that she can barely stand today and the pain is shooting down her buttocks and leg. She has not gotten the pain patches yet because they needed authorization from insurance. They have since been approved and she will get them today. She wanted to know if there is anything she can/should do since the weekend is approaching. Please advise.      (Message sent to both PCP and Dr Kassandra Patten)

## 2022-12-10 DIAGNOSIS — M48.061 SPINAL STENOSIS, LUMBAR REGION, WITHOUT NEUROGENIC CLAUDICATION: ICD-10-CM

## 2022-12-10 DIAGNOSIS — M54.16 RADICULOPATHY, LUMBAR REGION: ICD-10-CM

## 2022-12-12 DIAGNOSIS — M48.061 SPINAL STENOSIS, LUMBAR REGION, WITHOUT NEUROGENIC CLAUDICATION: ICD-10-CM

## 2022-12-12 DIAGNOSIS — M54.16 RADICULOPATHY, LUMBAR REGION: ICD-10-CM

## 2022-12-12 DIAGNOSIS — M54.50 LUMBAR BACK PAIN: ICD-10-CM

## 2022-12-12 RX ORDER — GABAPENTIN 600 MG/1
TABLET ORAL
Qty: 90 TABLET | Refills: 1 | Status: SHIPPED
Start: 2022-12-12 | End: 2023-01-31

## 2022-12-12 RX ORDER — TIZANIDINE 4 MG/1
TABLET ORAL
Qty: 90 TABLET | Refills: 1 | Status: SHIPPED | OUTPATIENT
Start: 2022-12-12

## 2022-12-13 RX ORDER — LIDOCAINE 50 MG/G
PATCH TOPICAL
Qty: 90 PATCH | OUTPATIENT
Start: 2022-12-13

## 2022-12-13 RX ORDER — MELOXICAM 15 MG/1
15 TABLET ORAL DAILY PRN
Qty: 90 TABLET | Refills: 1 | OUTPATIENT
Start: 2022-12-13

## 2022-12-13 RX ORDER — IBUPROFEN 800 MG/1
800 TABLET ORAL EVERY 8 HOURS PRN
Qty: 90 TABLET | Refills: 0 | OUTPATIENT
Start: 2022-12-13 | End: 2023-01-12

## 2023-01-31 ENCOUNTER — OFFICE VISIT (OUTPATIENT)
Dept: FAMILY MEDICINE CLINIC | Age: 48
End: 2023-01-31
Payer: MEDICARE

## 2023-01-31 VITALS
HEART RATE: 85 BPM | DIASTOLIC BLOOD PRESSURE: 78 MMHG | HEIGHT: 70 IN | OXYGEN SATURATION: 97 % | WEIGHT: 210.6 LBS | SYSTOLIC BLOOD PRESSURE: 114 MMHG | BODY MASS INDEX: 30.15 KG/M2 | TEMPERATURE: 98 F

## 2023-01-31 DIAGNOSIS — Z12.11 SCREENING FOR COLORECTAL CANCER: ICD-10-CM

## 2023-01-31 DIAGNOSIS — Z79.899 MEDICATION MANAGEMENT: ICD-10-CM

## 2023-01-31 DIAGNOSIS — F17.200 TOBACCO DEPENDENCE: ICD-10-CM

## 2023-01-31 DIAGNOSIS — M54.16 RADICULOPATHY, LUMBAR REGION: Primary | ICD-10-CM

## 2023-01-31 DIAGNOSIS — F31.0 BIPOLAR AFFECTIVE DISORDER, CURRENT EPISODE HYPOMANIC (HCC): ICD-10-CM

## 2023-01-31 DIAGNOSIS — M48.061 SPINAL STENOSIS, LUMBAR REGION, WITHOUT NEUROGENIC CLAUDICATION: ICD-10-CM

## 2023-01-31 DIAGNOSIS — G89.4 CHRONIC PAIN SYNDROME: ICD-10-CM

## 2023-01-31 DIAGNOSIS — M54.16 RADICULOPATHY, LUMBAR REGION: ICD-10-CM

## 2023-01-31 DIAGNOSIS — M54.50 LUMBAR BACK PAIN: ICD-10-CM

## 2023-01-31 DIAGNOSIS — E11.9 CONTROLLED TYPE 2 DIABETES MELLITUS WITHOUT COMPLICATION, WITHOUT LONG-TERM CURRENT USE OF INSULIN (HCC): ICD-10-CM

## 2023-01-31 DIAGNOSIS — Z12.12 SCREENING FOR COLORECTAL CANCER: ICD-10-CM

## 2023-01-31 PROBLEM — N18.30 CHRONIC RENAL DISEASE, STAGE III (HCC): Status: RESOLVED | Noted: 2022-10-12 | Resolved: 2023-01-31

## 2023-01-31 PROCEDURE — G8417 CALC BMI ABV UP PARAM F/U: HCPCS | Performed by: FAMILY MEDICINE

## 2023-01-31 PROCEDURE — 2022F DILAT RTA XM EVC RTNOPTHY: CPT | Performed by: FAMILY MEDICINE

## 2023-01-31 PROCEDURE — 3046F HEMOGLOBIN A1C LEVEL >9.0%: CPT | Performed by: FAMILY MEDICINE

## 2023-01-31 PROCEDURE — 99214 OFFICE O/P EST MOD 30 MIN: CPT | Performed by: FAMILY MEDICINE

## 2023-01-31 PROCEDURE — G8484 FLU IMMUNIZE NO ADMIN: HCPCS | Performed by: FAMILY MEDICINE

## 2023-01-31 PROCEDURE — G8427 DOCREV CUR MEDS BY ELIG CLIN: HCPCS | Performed by: FAMILY MEDICINE

## 2023-01-31 PROCEDURE — 4004F PT TOBACCO SCREEN RCVD TLK: CPT | Performed by: FAMILY MEDICINE

## 2023-01-31 RX ORDER — GABAPENTIN 800 MG/1
800 TABLET ORAL 3 TIMES DAILY
Qty: 90 TABLET | Refills: 2 | Status: SHIPPED | OUTPATIENT
Start: 2023-01-31 | End: 2023-05-01

## 2023-01-31 RX ORDER — PREDNISONE 20 MG/1
40 TABLET ORAL DAILY
Qty: 8 TABLET | Refills: 2 | Status: SHIPPED | OUTPATIENT
Start: 2023-01-31 | End: 2023-02-04

## 2023-01-31 RX ORDER — LIDOCAINE 50 MG/G
PATCH TOPICAL
Qty: 90 PATCH | OUTPATIENT
Start: 2023-01-31

## 2023-01-31 RX ORDER — OXYCODONE HYDROCHLORIDE 5 MG/1
5 TABLET ORAL EVERY 6 HOURS PRN
Qty: 12 TABLET | Refills: 0 | Status: SHIPPED | OUTPATIENT
Start: 2023-01-31 | End: 2023-02-03

## 2023-01-31 RX ORDER — IBUPROFEN 800 MG/1
800 TABLET ORAL EVERY 8 HOURS PRN
Qty: 90 TABLET | Refills: 0 | Status: CANCELLED | OUTPATIENT
Start: 2023-01-31 | End: 2023-03-02

## 2023-01-31 RX ORDER — NICOTINE 21 MG/24HR
1 PATCH, TRANSDERMAL 24 HOURS TRANSDERMAL EVERY 24 HOURS
Qty: 30 PATCH | Refills: 1 | Status: SHIPPED | OUTPATIENT
Start: 2023-01-31

## 2023-01-31 RX ORDER — GABAPENTIN 600 MG/1
TABLET ORAL
Qty: 90 TABLET | Refills: 1 | Status: CANCELLED | OUTPATIENT
Start: 2023-01-31

## 2023-01-31 RX ORDER — LAMOTRIGINE 100 MG/1
200 TABLET ORAL NIGHTLY
Qty: 60 TABLET | Refills: 0
Start: 2023-01-31

## 2023-01-31 RX ORDER — LIDOCAINE 50 MG/G
1 PATCH TOPICAL DAILY
Qty: 30 PATCH | Refills: 2 | Status: SHIPPED | OUTPATIENT
Start: 2023-01-31 | End: 2023-03-02

## 2023-01-31 RX ORDER — DICLOFENAC SODIUM 75 MG/1
75 TABLET, DELAYED RELEASE ORAL 2 TIMES DAILY PRN
Qty: 60 TABLET | Refills: 3 | Status: SHIPPED | OUTPATIENT
Start: 2023-01-31

## 2023-01-31 SDOH — ECONOMIC STABILITY: FOOD INSECURITY: WITHIN THE PAST 12 MONTHS, THE FOOD YOU BOUGHT JUST DIDN'T LAST AND YOU DIDN'T HAVE MONEY TO GET MORE.: OFTEN TRUE

## 2023-01-31 SDOH — ECONOMIC STABILITY: FOOD INSECURITY: WITHIN THE PAST 12 MONTHS, YOU WORRIED THAT YOUR FOOD WOULD RUN OUT BEFORE YOU GOT MONEY TO BUY MORE.: OFTEN TRUE

## 2023-01-31 ASSESSMENT — PATIENT HEALTH QUESTIONNAIRE - PHQ9
10. IF YOU CHECKED OFF ANY PROBLEMS, HOW DIFFICULT HAVE THESE PROBLEMS MADE IT FOR YOU TO DO YOUR WORK, TAKE CARE OF THINGS AT HOME, OR GET ALONG WITH OTHER PEOPLE: 3
SUM OF ALL RESPONSES TO PHQ9 QUESTIONS 1 & 2: 6
SUM OF ALL RESPONSES TO PHQ QUESTIONS 1-9: 15
8. MOVING OR SPEAKING SO SLOWLY THAT OTHER PEOPLE COULD HAVE NOTICED. OR THE OPPOSITE, BEING SO FIGETY OR RESTLESS THAT YOU HAVE BEEN MOVING AROUND A LOT MORE THAN USUAL: 0
SUM OF ALL RESPONSES TO PHQ QUESTIONS 1-9: 15
SUM OF ALL RESPONSES TO PHQ QUESTIONS 1-9: 15
7. TROUBLE CONCENTRATING ON THINGS, SUCH AS READING THE NEWSPAPER OR WATCHING TELEVISION: 3
9. THOUGHTS THAT YOU WOULD BE BETTER OFF DEAD, OR OF HURTING YOURSELF: 0
2. FEELING DOWN, DEPRESSED OR HOPELESS: 3
4. FEELING TIRED OR HAVING LITTLE ENERGY: 3
3. TROUBLE FALLING OR STAYING ASLEEP: 3
5. POOR APPETITE OR OVEREATING: 0
1. LITTLE INTEREST OR PLEASURE IN DOING THINGS: 3
6. FEELING BAD ABOUT YOURSELF - OR THAT YOU ARE A FAILURE OR HAVE LET YOURSELF OR YOUR FAMILY DOWN: 0
SUM OF ALL RESPONSES TO PHQ QUESTIONS 1-9: 15

## 2023-01-31 ASSESSMENT — ENCOUNTER SYMPTOMS
BLOOD IN STOOL: 0
TROUBLE SWALLOWING: 0
BACK PAIN: 1
ABDOMINAL PAIN: 0
SHORTNESS OF BREATH: 0

## 2023-01-31 ASSESSMENT — SOCIAL DETERMINANTS OF HEALTH (SDOH): HOW HARD IS IT FOR YOU TO PAY FOR THE VERY BASICS LIKE FOOD, HOUSING, MEDICAL CARE, AND HEATING?: NOT HARD AT ALL

## 2023-01-31 NOTE — PROGRESS NOTES
Kana Diana is a 50y.o. year old female Established patient, here for evaluation of the following chief complaint(s):    Chief Complaint   Patient presents with    Lower Back Pain         [place] Share was seen today for lower back pain. Diagnoses and all orders for this visit:    Radiculopathy, lumbar region  -     lidocaine (LIDODERM) 5 %; Place 1 patch onto the skin daily 12 hours on, 12 hours off.  -     gabapentin (NEURONTIN) 800 MG tablet; Take 1 tablet by mouth 3 times daily for 90 days. -     diclofenac (VOLTAREN) 75 MG EC tablet; Take 1 tablet by mouth 2 times daily as needed for Pain  -     TSH; Future  -     Vitamin B12 & Folate; Future  -     MRI LUMBAR SPINE W WO CONTRAST; Future  -     External Referral To Physical Therapy  -     predniSONE (DELTASONE) 20 MG tablet; Take 2 tablets by mouth daily for 4 days  -     oxyCODONE (ROXICODONE) 5 MG immediate release tablet; Take 1 tablet by mouth every 6 hours as needed for Pain for up to 3 days. Intended supply: 3 days. Take lowest dose possible to manage pain    Spinal stenosis, lumbar region, without neurogenic claudication  -     lidocaine (LIDODERM) 5 %; Place 1 patch onto the skin daily 12 hours on, 12 hours off.  -     gabapentin (NEURONTIN) 800 MG tablet; Take 1 tablet by mouth 3 times daily for 90 days. -     diclofenac (VOLTAREN) 75 MG EC tablet; Take 1 tablet by mouth 2 times daily as needed for Pain  -     External Referral To Physical Therapy  -     predniSONE (DELTASONE) 20 MG tablet; Take 2 tablets by mouth daily for 4 days  -     oxyCODONE (ROXICODONE) 5 MG immediate release tablet; Take 1 tablet by mouth every 6 hours as needed for Pain for up to 3 days. Intended supply: 3 days. Take lowest dose possible to manage pain    Lumbar back pain  -     lidocaine (LIDODERM) 5 %; Place 1 patch onto the skin daily 12 hours on, 12 hours off.  -     Vitamin B12 & Folate;  Future  -     External Referral To Physical Therapy  -     predniSONE (DELTASONE) 20 MG tablet; Take 2 tablets by mouth daily for 4 days  -     oxyCODONE (ROXICODONE) 5 MG immediate release tablet; Take 1 tablet by mouth every 6 hours as needed for Pain for up to 3 days. Intended supply: 3 days. Take lowest dose possible to manage pain    Chronic pain syndrome    Bipolar affective disorder, current episode hypomanic (HCC)  -     CBC with Auto Differential; Future  -     TSH; Future    Controlled type 2 diabetes mellitus without complication, without long-term current use of insulin (HCC)  -     Comprehensive Metabolic Panel; Future  -     Hemoglobin A1C; Future  -     Lipid Panel; Future    Screening for colorectal cancer  -     POCT Fit Test; Future    Tobacco dependence  -     nicotine (NICODERM CQ) 21 MG/24HR; Place 1 patch onto the skin every 24 hours    Medication management  -     lamoTRIgine (LAMICTAL) 100 MG tablet; Take 2 tablets by mouth nightly With 200mg tablet  -     lidocaine (LIDODERM) 5 %; Place 1 patch onto the skin daily 12 hours on, 12 hours off.  -     gabapentin (NEURONTIN) 800 MG tablet; Take 1 tablet by mouth 3 times daily for 90 days. -     diclofenac (VOLTAREN) 75 MG EC tablet; Take 1 tablet by mouth 2 times daily as needed for Pain    Plan  Lengthy discussion with patient regarding options for treating her back pain  I am recommending another MRI given the prior identification of fibrosis around the L5 nerve root on the left. We did discuss a graduated plan as follows   1) baseline level of treatment with gabapentin dose increase, Lidoderm patches, and NSAIDs as above. Do trial of diclofenac. If not effective would go back to ibuprofen. 2) for worsening radicular flares, would stop the NSAID and use prednisone  3) if prednisone burst ineffective, did provide a small prescription for Oxy IR to be used only in an emergency.   Controlled substances monitoring: possible medication side effects, risk of tolerance and/or dependence, and alternative treatments discussed, no signs of potential drug abuse or diversion identified, and [place]ARRS report reviewed today- activity consistent with treatment plan. Advised if MRI is suggestive of additional findings or she fails to respond to this treatment and may require her to see a specialist.  She verbalizes understanding. Checking routine labs for her diabetes and hyperlipidemia. Bipolar disorder appears stable. Follow-up per psychology/psychiatry    Trial of nicotine patch. Proper use discussed. Chronic medication management performed as above        Patient/guardian was given the opportunity to ask questions regarding the visit today. Questions were addressed. They verbalized comfort and understanding of the assessment and plan. Return in about 2 months (around 3/31/2023), or if symptoms worsen or fail to improve, for check on recent med changes/start. SUBJECTIVE/OBJECTIVE:    HPI    Lumbar disc disease, lumbar stenosis follow-up  Continues to have problems. This is a chronic issue this been present for years  Had an MRI in 2021 that showed fibrosis around the left-sided nerve root of L5. However, CT scan in 2022 was less revealing  Continues to have nearly daily back pain. Located primarily in the left lower lumbar area.   Will radiate down left leg  Not accompanying any bowel or bladder dysfunction  Pain can be anywhere from mild/aching to severe and sharp  Has been through several treatment modalities, failing many of them  NSAIDs have been less effective  Lidocaine patches help but did not stay on  Gabapentin seems to be plateauing  Is worried because she is leaving for a 1+ month tour of [place] including [place] and is worried about her ability to meet the demands of travel    Bipolar disorder  Follow-up  Working with mental health professionals  Medications are reviewed  States that doses have been adjusted  Also having hard time finding Adderall so has been out of it  Denies any SI or HI  Overall acknowledges some variability in her clinical course but is overall comfortable and feels issues are stable    History of hyperlipidemia and type 2 diabetes  These are generally controlled on present regimen  Due for routine labs  Not following blood glucose levels  Reports compliance with statin    Tobacco abuse  She wants to quit smoking  States cannot tolerate Chantix  Interested in nicotine patches    Health maintenance  Due for colorectal cancer screening. Does not want colonoscopy        Review of Systems   Constitutional:  Positive for fatigue. Negative for fever. HENT:  Negative for trouble swallowing. Respiratory:  Negative for shortness of breath. Cardiovascular:  Negative for chest pain and palpitations. Gastrointestinal:  Negative for abdominal pain and blood in stool. Genitourinary:  Negative for difficulty urinating. Musculoskeletal:  Positive for arthralgias and back pain. Objective   /78   Pulse 85   Temp 98 °F (36.7 °C) (Temporal)   Ht 5' 10\" (1.778 m)   Wt 210 lb 9.6 oz (95.5 kg)   LMP  (LMP Unknown)   SpO2 97%   BMI 30.22 kg/m²     Physical Exam  Vitals reviewed. Constitutional:       General: She is not in acute distress. Appearance: Normal appearance. HENT:      Mouth/Throat:      Mouth: Mucous membranes are moist.      Pharynx: Oropharynx is clear. Eyes:      Conjunctiva/sclera: Conjunctivae normal.   Cardiovascular:      Rate and Rhythm: Normal rate and regular rhythm. Pulses: Normal pulses. Heart sounds: No murmur heard. Pulmonary:      Effort: Pulmonary effort is normal. No respiratory distress. Breath sounds: Normal breath sounds. Abdominal:      General: Bowel sounds are normal. There is no distension. Palpations: Abdomen is soft. Tenderness: There is no abdominal tenderness. Musculoskeletal:      Lumbar back: Tenderness present. Decreased range of motion.  Negative right straight leg raise test and negative left straight leg raise test.      Right lower leg: No edema. Left lower leg: No edema. Skin:     General: Skin is warm and dry. Neurological:      General: No focal deficit present. Mental Status: She is alert and oriented to person, place, and time. Motor: No weakness. Gait: Gait normal.      Deep Tendon Reflexes: Reflexes normal.   Psychiatric:         Mood and Affect: Mood normal.         Behavior: Behavior normal.         Cognition and Memory: Cognition and memory normal.         Judgment: Judgment normal.            An electronic signature was used to authenticate this note.     --Hunter Villafuerte MD

## 2023-02-01 LAB
ALBUMIN SERPL-MCNC: 4 G/DL (ref 3.5–5.2)
ALP BLD-CCNC: 123 U/L (ref 35–104)
ALT SERPL-CCNC: 7 U/L (ref 0–32)
ANION GAP SERPL CALCULATED.3IONS-SCNC: 12 MMOL/L (ref 7–16)
AST SERPL-CCNC: 12 U/L (ref 0–31)
BILIRUB SERPL-MCNC: <0.2 MG/DL (ref 0–1.2)
BUN BLDV-MCNC: 10 MG/DL (ref 6–20)
CALCIUM SERPL-MCNC: 9.2 MG/DL (ref 8.6–10.2)
CHLORIDE BLD-SCNC: 107 MMOL/L (ref 98–107)
CHOLESTEROL, TOTAL: 269 MG/DL (ref 0–199)
CO2: 22 MMOL/L (ref 22–29)
CREAT SERPL-MCNC: 0.9 MG/DL (ref 0.5–1)
FOLATE: >20 NG/ML (ref 4.8–24.2)
GFR SERPL CREATININE-BSD FRML MDRD: >60 ML/MIN/1.73
GLUCOSE BLD-MCNC: 92 MG/DL (ref 74–99)
HDLC SERPL-MCNC: 68 MG/DL
LDL CHOLESTEROL CALCULATED: 176 MG/DL (ref 0–99)
POTASSIUM SERPL-SCNC: 4.9 MMOL/L (ref 3.5–5)
SODIUM BLD-SCNC: 141 MMOL/L (ref 132–146)
TOTAL PROTEIN: 7.1 G/DL (ref 6.4–8.3)
TRIGL SERPL-MCNC: 126 MG/DL (ref 0–149)
TSH SERPL DL<=0.05 MIU/L-ACNC: 0.34 UIU/ML (ref 0.27–4.2)
VITAMIN B-12: 517 PG/ML (ref 211–946)
VLDLC SERPL CALC-MCNC: 25 MG/DL

## 2023-03-24 ENCOUNTER — TELEPHONE (OUTPATIENT)
Dept: FAMILY MEDICINE CLINIC | Age: 48
End: 2023-03-24

## 2023-03-24 DIAGNOSIS — M48.061 SPINAL STENOSIS, LUMBAR REGION, WITHOUT NEUROGENIC CLAUDICATION: ICD-10-CM

## 2023-03-24 DIAGNOSIS — M54.16 RADICULOPATHY, LUMBAR REGION: ICD-10-CM

## 2023-03-24 DIAGNOSIS — M54.50 LUMBAR BACK PAIN: ICD-10-CM

## 2023-03-24 NOTE — TELEPHONE ENCOUNTER
----- Message from Mary Shen sent at 3/24/2023  1:21 PM EDT -----  Subject: Message to Provider    QUESTIONS  Information for Provider? patient request to speak to a nurse , patient   will not be able to see her provider she had to cancel her appt. she will   be leaving country due to an emergency . patient also ask to speak to a   nurse for a medication refill need a new script wow out of the country . please call patient at this number [phone]  ---------------------------------------------------------------------------  --------------  [address] [facility]  [phone]; OK to leave message on voicemail  ---------------------------------------------------------------------------  --------------  SCRIPT ANSWERS  Relationship to Patient?  Self

## 2023-03-24 NOTE — TELEPHONE ENCOUNTER
Spoke to patient. She canceled her appointment for 3/28. She will be out of the country on a cruise. She asked for a refill of her oxycodone to be sent to [facility] in [place]. \"Someone will pick it up and mail it to me\".

## 2023-03-25 RX ORDER — OXYCODONE HYDROCHLORIDE 5 MG/1
5 TABLET ORAL EVERY 6 HOURS PRN
Qty: 12 TABLET | Refills: 0 | OUTPATIENT
Start: 2023-03-25 | End: 2023-03-28

## 2023-03-25 NOTE — TELEPHONE ENCOUNTER
The intent of that prescription was not to be something that she used on the routine basis. I would have wanted to reassess her to determine why pain is still present that requires this medication. I cannot simply refill this.   moreover it is concerning that she is going to be out of the country yet wanted this prescription refilled

## 2023-05-11 ENCOUNTER — TELEPHONE (OUTPATIENT)
Dept: FAMILY MEDICINE CLINIC | Age: 48
End: 2023-05-11

## 2023-05-11 DIAGNOSIS — F31.0 BIPOLAR AFFECTIVE DISORDER, CURRENT EPISODE HYPOMANIC (HCC): Primary | ICD-10-CM

## 2023-05-11 NOTE — TELEPHONE ENCOUNTER
Patient is seeing a nurse practioner in Dr. Hien Steve' office and they need a referral from Dr. Patricia Almeida sent by fax to [place] at [phone] please.

## 2023-08-01 ENCOUNTER — TELEPHONE (OUTPATIENT)
Dept: FAMILY MEDICINE CLINIC | Age: 48
End: 2023-08-01

## 2023-08-01 NOTE — TELEPHONE ENCOUNTER
Pt called requesting same day appt with PCP; PCP no availability and no openings with any providers left in office at time of ph. Call. Pt states she is having really bad pain starting under/on shoulder blade radiating down L arm/side, and into her neck. States the only thing that gives any relief is using heat- Describes pain as feeling like spasms and also feels like its getting worse every day. Pain began on Friday and has tried all meds. previously given for spasms/pain and nothing is working. States no other symptoms     Advised pt she should report to ED to be evaluated due to no openings in the office this afternoon and recommended she not wait till tomorrow to be seen with the kind of pain she's having.

## 2023-08-07 ENCOUNTER — TELEPHONE (OUTPATIENT)
Dept: FAMILY MEDICINE CLINIC | Age: 48
End: 2023-08-07

## 2023-08-07 DIAGNOSIS — E11.9 CONTROLLED TYPE 2 DIABETES MELLITUS WITHOUT COMPLICATION, WITHOUT LONG-TERM CURRENT USE OF INSULIN (HCC): ICD-10-CM

## 2023-08-07 DIAGNOSIS — E78.2 MIXED HYPERLIPIDEMIA: Primary | ICD-10-CM

## 2023-08-07 NOTE — TELEPHONE ENCOUNTER
Last Appointment   1/31/2023  Next Appointment  Visit date not found  Patient did not go to the ER wanted you to order an MRI with Contrast for her and blood work for routine check, also wants to get in to to see you soon. Please advise as you have no appts until 9-21.   Thanks

## 2023-08-09 NOTE — TELEPHONE ENCOUNTER
Last Appointment   1/31/2023  Next Appointment  8/11/2023  Patient notified and verbalized understanding and will get labs done first thing tomorrow am. Scheduled with Dr Christine Marshall Friday

## 2023-08-09 NOTE — TELEPHONE ENCOUNTER
Would recommend being seen first.  MRI was ordered over 6 months ago but did not get. Needs to be re-evaluated to assure it is still needed. I will re-order labs  Can schedule with resident on day I precept to be seen sooner.

## 2023-08-10 DIAGNOSIS — E78.2 MIXED HYPERLIPIDEMIA: ICD-10-CM

## 2023-08-10 DIAGNOSIS — E11.9 CONTROLLED TYPE 2 DIABETES MELLITUS WITHOUT COMPLICATION, WITHOUT LONG-TERM CURRENT USE OF INSULIN (HCC): ICD-10-CM

## 2023-08-10 LAB
ABSOLUTE IMMATURE GRANULOCYTE: 0.04 K/UL (ref 0–0.58)
ALBUMIN SERPL-MCNC: 4.3 G/DL (ref 3.5–5.2)
ALP BLD-CCNC: 124 U/L (ref 35–104)
ALT SERPL-CCNC: 7 U/L (ref 0–32)
ANION GAP SERPL CALCULATED.3IONS-SCNC: 15 MMOL/L (ref 7–16)
AST SERPL-CCNC: 10 U/L (ref 0–31)
BASOPHILS ABSOLUTE: 0.04 K/UL (ref 0–0.2)
BASOPHILS RELATIVE PERCENT: 1 % (ref 0–2)
BILIRUB SERPL-MCNC: <0.2 MG/DL (ref 0–1.2)
BUN BLDV-MCNC: 17 MG/DL (ref 6–20)
CALCIUM SERPL-MCNC: 9.1 MG/DL (ref 8.6–10.2)
CHLORIDE BLD-SCNC: 107 MMOL/L (ref 98–107)
CHOLESTEROL: 306 MG/DL
CO2: 22 MMOL/L (ref 22–29)
CREAT SERPL-MCNC: 1.1 MG/DL (ref 0.5–1)
EOSINOPHILS ABSOLUTE: 0.15 K/UL (ref 0.05–0.5)
EOSINOPHILS RELATIVE PERCENT: 2 % (ref 0–6)
GFR SERPL CREATININE-BSD FRML MDRD: >60 ML/MIN/1.73M2
GLUCOSE BLD-MCNC: 118 MG/DL (ref 74–99)
HBA1C MFR BLD: 6.1 % (ref 4–5.6)
HCT VFR BLD CALC: 42.6 % (ref 34–48)
HDLC SERPL-MCNC: 66 MG/DL
HEMOGLOBIN: 13.4 G/DL (ref 11.5–15.5)
IMMATURE GRANULOCYTES: 1 % (ref 0–5)
LDL CHOLESTEROL: 209 MG/DL
LYMPHOCYTES ABSOLUTE: 1.82 K/UL (ref 1.5–4)
LYMPHOCYTES RELATIVE PERCENT: 24 % (ref 20–42)
MCH RBC QN AUTO: 31.4 PG (ref 26–35)
MCHC RBC AUTO-ENTMCNC: 31.5 G/DL (ref 32–34.5)
MCV RBC AUTO: 99.8 FL (ref 80–99.9)
MICROALBUMIN/CREAT 24H UR: <12 MG/L (ref 0–19)
MONOCYTES ABSOLUTE: 0.41 K/UL (ref 0.1–0.95)
MONOCYTES RELATIVE PERCENT: 5 % (ref 2–12)
NEUTROPHILS ABSOLUTE: 5.1 K/UL (ref 1.8–7.3)
NEUTROPHILS RELATIVE PERCENT: 68 % (ref 43–80)
PDW BLD-RTO: 14 % (ref 11.5–15)
PLATELET # BLD: 251 K/UL (ref 130–450)
PMV BLD AUTO: 11.4 FL (ref 7–12)
POTASSIUM SERPL-SCNC: 4.5 MMOL/L (ref 3.5–5)
RBC # BLD: 4.27 M/UL (ref 3.5–5.5)
SODIUM BLD-SCNC: 144 MMOL/L (ref 132–146)
TOTAL PROTEIN: 7.1 G/DL (ref 6.4–8.3)
TRIGL SERPL-MCNC: 156 MG/DL
VLDLC SERPL CALC-MCNC: 31 MG/DL
WBC # BLD: 7.6 K/UL (ref 4.5–11.5)

## 2023-08-11 ENCOUNTER — OFFICE VISIT (OUTPATIENT)
Dept: FAMILY MEDICINE CLINIC | Age: 48
End: 2023-08-11

## 2023-08-11 VITALS
HEIGHT: 70 IN | WEIGHT: 220 LBS | TEMPERATURE: 97.2 F | DIASTOLIC BLOOD PRESSURE: 84 MMHG | BODY MASS INDEX: 31.5 KG/M2 | SYSTOLIC BLOOD PRESSURE: 139 MMHG | HEART RATE: 99 BPM | RESPIRATION RATE: 16 BRPM | OXYGEN SATURATION: 96 %

## 2023-08-11 DIAGNOSIS — M48.061 SPINAL STENOSIS, LUMBAR REGION, WITHOUT NEUROGENIC CLAUDICATION: ICD-10-CM

## 2023-08-11 DIAGNOSIS — Z79.899 MEDICATION MANAGEMENT: ICD-10-CM

## 2023-08-11 DIAGNOSIS — M54.16 RADICULOPATHY, LUMBAR REGION: ICD-10-CM

## 2023-08-11 DIAGNOSIS — R93.7 ABNORMAL CT SCAN, CERVICAL SPINE: ICD-10-CM

## 2023-08-11 DIAGNOSIS — G47.9 DIFFICULTY SLEEPING: ICD-10-CM

## 2023-08-11 DIAGNOSIS — M25.512 ACUTE PAIN OF LEFT SHOULDER: ICD-10-CM

## 2023-08-11 DIAGNOSIS — E78.00 ELEVATED LDL CHOLESTEROL LEVEL: ICD-10-CM

## 2023-08-11 DIAGNOSIS — M54.12 CERVICAL RADICULOPATHY: Primary | ICD-10-CM

## 2023-08-11 RX ORDER — TRAMADOL HYDROCHLORIDE 50 MG/1
50 TABLET ORAL EVERY 6 HOURS PRN
Qty: 12 TABLET | Refills: 0 | Status: SHIPPED | OUTPATIENT
Start: 2023-08-11 | End: 2023-08-14

## 2023-08-11 RX ORDER — GABAPENTIN 800 MG/1
800 TABLET ORAL 3 TIMES DAILY
Qty: 90 TABLET | Refills: 2 | Status: SHIPPED | OUTPATIENT
Start: 2023-08-11 | End: 2023-11-09

## 2023-08-11 RX ORDER — PREDNISONE 10 MG/1
TABLET ORAL
Qty: 30 TABLET | Refills: 0 | Status: SHIPPED | OUTPATIENT
Start: 2023-08-11 | End: 2023-08-21

## 2023-08-11 RX ORDER — TIZANIDINE 4 MG/1
TABLET ORAL
Qty: 90 TABLET | Refills: 0 | Status: SHIPPED | OUTPATIENT
Start: 2023-08-11

## 2023-08-11 RX ORDER — TRAZODONE HYDROCHLORIDE 50 MG/1
50 TABLET ORAL DAILY
Status: CANCELLED | OUTPATIENT
Start: 2023-08-11

## 2023-08-11 RX ORDER — TRAZODONE HYDROCHLORIDE 50 MG/1
50 TABLET ORAL NIGHTLY
Qty: 30 TABLET | Refills: 0 | Status: SHIPPED | OUTPATIENT
Start: 2023-08-11

## 2023-08-11 RX ORDER — DICLOFENAC SODIUM 75 MG/1
75 TABLET, DELAYED RELEASE ORAL 2 TIMES DAILY PRN
Qty: 60 TABLET | Refills: 3 | Status: SHIPPED | OUTPATIENT
Start: 2023-08-11

## 2023-08-11 SDOH — ECONOMIC STABILITY: HOUSING INSECURITY
IN THE LAST 12 MONTHS, WAS THERE A TIME WHEN YOU DID NOT HAVE A STEADY PLACE TO SLEEP OR SLEPT IN A SHELTER (INCLUDING NOW)?: NO

## 2023-08-11 SDOH — ECONOMIC STABILITY: FOOD INSECURITY: WITHIN THE PAST 12 MONTHS, YOU WORRIED THAT YOUR FOOD WOULD RUN OUT BEFORE YOU GOT MONEY TO BUY MORE.: NEVER TRUE

## 2023-08-11 SDOH — ECONOMIC STABILITY: FOOD INSECURITY: WITHIN THE PAST 12 MONTHS, THE FOOD YOU BOUGHT JUST DIDN'T LAST AND YOU DIDN'T HAVE MONEY TO GET MORE.: NEVER TRUE

## 2023-08-11 SDOH — ECONOMIC STABILITY: INCOME INSECURITY: HOW HARD IS IT FOR YOU TO PAY FOR THE VERY BASICS LIKE FOOD, HOUSING, MEDICAL CARE, AND HEATING?: NOT HARD AT ALL

## 2023-08-11 ASSESSMENT — ENCOUNTER SYMPTOMS
CONSTIPATION: 0
ABDOMINAL PAIN: 0
SHORTNESS OF BREATH: 0
DIARRHEA: 0
NAUSEA: 0
VOMITING: 0

## 2023-08-11 NOTE — PROGRESS NOTES
[facility]  Department of Family Medicine  Family Medicine Residency Program      Patient: Salud Lora 46 y.o. female     Date of Service: 8/10/23      Chief complaint:   Chief Complaint   Patient presents with    Shoulder Pain       HISTORY OF PRESENTING ILLNESS     46 y.o. female PMH chronic pain syndrome (low back), bipolar disorder, T2DM, tobacco dependence presented to the clinic for L shoulder pain. Pain started in left shoulder blade to shoulder down to elbow for two weeks, constant ache, muscle spasms, heat makes it feel better \"feels heavy,\" denies feelings of subluxation, denies electric shock feeling, pain 7/10 on average, reports no problem with  strength, denies numbness or tingling; similar episode 2 months ago while on cruise, felt better after one week, patient reports being sedentary during both episodes, having hard time showering, \"quality of life is down,\" tried gabapentin, voltaren gel, voltaren pills, lidocaine patches, salonpas, Zanaflex has not helped, using TENS unit without relief, finished prednisone.  Reports when living in [place] 3 years ago they were going to do MRIs of her cervical spine because she was told Joselito Johnson would likely need a fusion\"    Health Maintenance:  Health Maintenance Due   Topic Date Due    Diabetic foot exam  Never done    HIV screen  Never done    Hepatitis C screen  Never done    Hepatitis B vaccine (1 of 3 - Risk 3-dose series) Never done    Cervical cancer screen  Never done    Diabetic retinal exam  08/06/2010    Diabetic Alb to Cr ratio (uACR) test  10/01/2019    Colorectal Cancer Screen  07/26/2020    DTaP/Tdap/Td vaccine (1 - Tdap) 04/22/2022    COVID-19 Vaccine (2 - Pfizer series) 05/30/2023    Flu vaccine (1) Never done    Shingles vaccine (1 of 2) Never done    Low dose CT lung screening &/or counseling  Never done    Breast cancer screen  08/09/2023     Past Medical History:      Diagnosis Date    Ankle fracture,

## 2023-08-28 ENCOUNTER — TELEPHONE (OUTPATIENT)
Dept: FAMILY MEDICINE CLINIC | Age: 48
End: 2023-08-28

## 2023-08-28 DIAGNOSIS — M54.12 CERVICAL RADICULOPATHY: Primary | ICD-10-CM

## 2023-08-28 NOTE — TELEPHONE ENCOUNTER
Dr Mary Jerez placed order for MRI Cervical Spine with Contrast, [facility] called and needs order changed to with and without contrast, please change order.      Fax new order to [phone]  *they cannot schedule pt until they receive new order

## 2023-09-10 DIAGNOSIS — M54.16 RADICULOPATHY, LUMBAR REGION: ICD-10-CM

## 2023-09-10 DIAGNOSIS — M48.061 SPINAL STENOSIS, LUMBAR REGION, WITHOUT NEUROGENIC CLAUDICATION: ICD-10-CM

## 2023-09-11 RX ORDER — TIZANIDINE 4 MG/1
TABLET ORAL
Qty: 90 TABLET | Refills: 0 | Status: SHIPPED | OUTPATIENT
Start: 2023-09-11

## 2023-10-11 DIAGNOSIS — M48.061 SPINAL STENOSIS, LUMBAR REGION, WITHOUT NEUROGENIC CLAUDICATION: ICD-10-CM

## 2023-10-11 DIAGNOSIS — M54.16 RADICULOPATHY, LUMBAR REGION: ICD-10-CM

## 2023-10-11 RX ORDER — TIZANIDINE 4 MG/1
TABLET ORAL
Qty: 90 TABLET | Refills: 0 | OUTPATIENT
Start: 2023-10-11

## 2023-11-11 DIAGNOSIS — M54.16 RADICULOPATHY, LUMBAR REGION: ICD-10-CM

## 2023-11-11 DIAGNOSIS — Z79.899 MEDICATION MANAGEMENT: ICD-10-CM

## 2023-11-11 DIAGNOSIS — M48.061 SPINAL STENOSIS, LUMBAR REGION, WITHOUT NEUROGENIC CLAUDICATION: ICD-10-CM

## 2023-11-13 RX ORDER — GABAPENTIN 800 MG/1
800 TABLET ORAL 3 TIMES DAILY
Qty: 90 TABLET | Refills: 2 | OUTPATIENT
Start: 2023-11-13

## 2023-12-09 DIAGNOSIS — M54.16 RADICULOPATHY, LUMBAR REGION: ICD-10-CM

## 2023-12-09 DIAGNOSIS — M48.061 SPINAL STENOSIS, LUMBAR REGION, WITHOUT NEUROGENIC CLAUDICATION: ICD-10-CM

## 2023-12-09 DIAGNOSIS — Z79.899 MEDICATION MANAGEMENT: ICD-10-CM

## 2023-12-11 RX ORDER — DICLOFENAC SODIUM 75 MG/1
75 TABLET, DELAYED RELEASE ORAL 2 TIMES DAILY PRN
Qty: 60 TABLET | Refills: 3 | OUTPATIENT
Start: 2023-12-11

## 2024-07-01 ENCOUNTER — OFFICE VISIT (OUTPATIENT)
Dept: FAMILY MEDICINE CLINIC | Age: 49
End: 2024-07-01
Payer: MEDICARE

## 2024-07-01 VITALS
OXYGEN SATURATION: 98 % | BODY MASS INDEX: 31.47 KG/M2 | WEIGHT: 219.8 LBS | SYSTOLIC BLOOD PRESSURE: 120 MMHG | DIASTOLIC BLOOD PRESSURE: 74 MMHG | RESPIRATION RATE: 18 BRPM | HEART RATE: 98 BPM | HEIGHT: 70 IN | TEMPERATURE: 97.7 F

## 2024-07-01 DIAGNOSIS — M54.16 RADICULOPATHY, LUMBAR REGION: ICD-10-CM

## 2024-07-01 DIAGNOSIS — G47.9 DIFFICULTY SLEEPING: ICD-10-CM

## 2024-07-01 DIAGNOSIS — J30.2 SEASONAL ALLERGIES: ICD-10-CM

## 2024-07-01 DIAGNOSIS — K21.9 GASTROESOPHAGEAL REFLUX DISEASE, UNSPECIFIED WHETHER ESOPHAGITIS PRESENT: ICD-10-CM

## 2024-07-01 DIAGNOSIS — M25.562 ACUTE PAIN OF LEFT KNEE: Primary | ICD-10-CM

## 2024-07-01 DIAGNOSIS — M25.512 ACUTE PAIN OF LEFT SHOULDER: ICD-10-CM

## 2024-07-01 DIAGNOSIS — M48.061 SPINAL STENOSIS, LUMBAR REGION, WITHOUT NEUROGENIC CLAUDICATION: ICD-10-CM

## 2024-07-01 DIAGNOSIS — Z79.899 MEDICATION MANAGEMENT: ICD-10-CM

## 2024-07-01 PROCEDURE — G8417 CALC BMI ABV UP PARAM F/U: HCPCS

## 2024-07-01 PROCEDURE — 99214 OFFICE O/P EST MOD 30 MIN: CPT

## 2024-07-01 PROCEDURE — 4004F PT TOBACCO SCREEN RCVD TLK: CPT

## 2024-07-01 PROCEDURE — G8427 DOCREV CUR MEDS BY ELIG CLIN: HCPCS

## 2024-07-01 PROCEDURE — 3017F COLORECTAL CA SCREEN DOC REV: CPT

## 2024-07-01 RX ORDER — OMEPRAZOLE 20 MG/1
20 CAPSULE, DELAYED RELEASE ORAL DAILY
COMMUNITY
End: 2024-07-01 | Stop reason: SDUPTHER

## 2024-07-01 RX ORDER — CETIRIZINE HYDROCHLORIDE 10 MG/1
10 TABLET ORAL DAILY
COMMUNITY
End: 2024-07-01 | Stop reason: SDUPTHER

## 2024-07-01 RX ORDER — TRAMADOL HYDROCHLORIDE 50 MG/1
50 TABLET ORAL 3 TIMES DAILY PRN
Qty: 12 TABLET | Refills: 0 | Status: SHIPPED
Start: 2024-07-01 | End: 2024-07-05 | Stop reason: SDUPTHER

## 2024-07-01 RX ORDER — OMEPRAZOLE 20 MG/1
20 CAPSULE, DELAYED RELEASE ORAL DAILY
Qty: 30 CAPSULE | Refills: 0 | Status: SHIPPED | OUTPATIENT
Start: 2024-07-01

## 2024-07-01 RX ORDER — CETIRIZINE HYDROCHLORIDE 10 MG/1
10 TABLET ORAL DAILY
Qty: 30 TABLET | Refills: 0 | Status: SHIPPED | OUTPATIENT
Start: 2024-07-01

## 2024-07-01 RX ORDER — DICLOFENAC SODIUM 75 MG/1
75 TABLET, DELAYED RELEASE ORAL 2 TIMES DAILY PRN
Qty: 60 TABLET | Refills: 3 | Status: SHIPPED | OUTPATIENT
Start: 2024-07-01

## 2024-07-01 RX ORDER — TIZANIDINE 4 MG/1
TABLET ORAL
Qty: 90 TABLET | Refills: 0 | Status: CANCELLED | OUTPATIENT
Start: 2024-07-01

## 2024-07-01 RX ORDER — GABAPENTIN 800 MG/1
800 TABLET ORAL 3 TIMES DAILY
Qty: 90 TABLET | Refills: 2 | Status: SHIPPED | OUTPATIENT
Start: 2024-07-01 | End: 2024-09-29

## 2024-07-01 RX ORDER — OMEPRAZOLE 20 MG/1
20 CAPSULE, DELAYED RELEASE ORAL DAILY
Qty: 30 CAPSULE | Status: CANCELLED | OUTPATIENT
Start: 2024-07-01

## 2024-07-01 RX ORDER — HYDROXYZINE PAMOATE 25 MG/1
25 CAPSULE ORAL 2 TIMES DAILY PRN
COMMUNITY
Start: 2024-06-11

## 2024-07-01 RX ORDER — TRAZODONE HYDROCHLORIDE 50 MG/1
50 TABLET ORAL NIGHTLY
Qty: 30 TABLET | Refills: 0 | Status: SHIPPED | OUTPATIENT
Start: 2024-07-01

## 2024-07-01 RX ORDER — CETIRIZINE HYDROCHLORIDE 10 MG/1
10 TABLET ORAL DAILY
Qty: 30 TABLET | Status: CANCELLED | OUTPATIENT
Start: 2024-07-01

## 2024-07-01 ASSESSMENT — PATIENT HEALTH QUESTIONNAIRE - PHQ9
SUM OF ALL RESPONSES TO PHQ9 QUESTIONS 1 & 2: 6
1. LITTLE INTEREST OR PLEASURE IN DOING THINGS: NEARLY EVERY DAY
SUM OF ALL RESPONSES TO PHQ QUESTIONS 1-9: 15
5. POOR APPETITE OR OVEREATING: NOT AT ALL
6. FEELING BAD ABOUT YOURSELF - OR THAT YOU ARE A FAILURE OR HAVE LET YOURSELF OR YOUR FAMILY DOWN: NOT AT ALL
9. THOUGHTS THAT YOU WOULD BE BETTER OFF DEAD, OR OF HURTING YOURSELF: NOT AT ALL
10. IF YOU CHECKED OFF ANY PROBLEMS, HOW DIFFICULT HAVE THESE PROBLEMS MADE IT FOR YOU TO DO YOUR WORK, TAKE CARE OF THINGS AT HOME, OR GET ALONG WITH OTHER PEOPLE: EXTREMELY DIFFICULT
7. TROUBLE CONCENTRATING ON THINGS, SUCH AS READING THE NEWSPAPER OR WATCHING TELEVISION: NOT AT ALL
2. FEELING DOWN, DEPRESSED OR HOPELESS: NEARLY EVERY DAY
3. TROUBLE FALLING OR STAYING ASLEEP: NEARLY EVERY DAY
SUM OF ALL RESPONSES TO PHQ QUESTIONS 1-9: 15
8. MOVING OR SPEAKING SO SLOWLY THAT OTHER PEOPLE COULD HAVE NOTICED. OR THE OPPOSITE, BEING SO FIGETY OR RESTLESS THAT YOU HAVE BEEN MOVING AROUND A LOT MORE THAN USUAL: NEARLY EVERY DAY
4. FEELING TIRED OR HAVING LITTLE ENERGY: NEARLY EVERY DAY

## 2024-07-01 ASSESSMENT — ENCOUNTER SYMPTOMS
VOMITING: 0
DIARRHEA: 0
ABDOMINAL PAIN: 0
CHEST TIGHTNESS: 0
NAUSEA: 0
SHORTNESS OF BREATH: 0

## 2024-07-01 NOTE — PROGRESS NOTES
.    [facility]  Precepting Note    Subjective:  Left knee pain while getting up from toilet  Swelling on left knee  Taking NSAIDS- taking 3  NSAIDS together  Applying ice  Her left leg is giving away      ROS otherwise negative     Past medical, surgical, family and social history were reviewed, non-contributory, and unchanged unless otherwise stated.    Objective:    /74   Pulse 98   Temp 97.7 °F (36.5 °C)   Resp 18   Ht 1.778 m (5' 10\")   Wt 99.7 kg (219 lb 12.8 oz)   LMP 07/26/2019   SpO2 98%   BMI 31.54 kg/m²     Exam is as noted by resident   Severe tenderness along joint line  + effusion  ROM restricted     Assessment/Plan:  Left knee pain- severe after injury: MRI, ortho  Pain control with tramadol  Follow up as instructed     Attending Physician Statement  I have reviewed the chart, including any radiology or labs. I have seen the patient, discussed the case, including pertinent history and exam findings with the resident.  I agree with the assessment, plan and orders as documented by the resident.  Please refer to the resident note for additional information.      Electronically signed by MERCY HUMMEL MD on 7/1/2024 at 4:07 PM   
treatment plan.    Call or go to EDmediately if symptoms worsen or persist. Advised patient to call with any new medication issues, and, as applicable, read all Rx info from pharmacy to assure aware of all possible risks and side effects of medicationbefore taking.     Patient and/or guardian given opportunity to ask questions/raise concerns.  The patient verbalized comfort and understanding ofinstructions.    I encourage further reading and education about your health conditions.  Information on many health conditions is provided by the[facility] [employer]: [email or web address].org/  Please bring any questions to me at your nextvisit.    Return to Office: Return in about 1 week (around 7/8/2024) for Right knee pain.    Medication List:    Current Outpatient Medications   Medication Sig Dispense Refill    hydrOXYzine pamoate (VISTARIL) 25 MG capsule Take 1 capsule by mouth 2 times daily as needed for Anxiety      diclofenac (VOLTAREN) 75 MG EC tablet Take 1 tablet by mouth 2 times daily as needed for Pain 60 tablet 3    diclofenac sodium (VOLTAREN) 1 % GEL Apply 2 g topically 4 times daily 100 g 0    gabapentin (NEURONTIN) 800 MG tablet Take 1 tablet by mouth 3 times daily for 90 days. 90 tablet 2    traZODone (DESYREL) 50 MG tablet Take 1 tablet by mouth nightly 30 tablet 0    traMADol (ULTRAM) 50 MG tablet Take 1 tablet by mouth 3 times daily as needed for Pain for up to 5 days. Intended supply: 3 days. Take lowest dose possible to manage pain Max Daily Amount: 150 mg 12 tablet 0    cetirizine (ZYRTEC) 10 MG tablet Take 1 tablet by mouth daily 30 tablet 0    omeprazole (PRILOSEC) 20 MG delayed release capsule Take 1 capsule by mouth daily 30 capsule 0    tiZANidine (ZANAFLEX) 4 MG tablet TAKE 1 TABLET BY MOUTH EVERY 8 HOURS AS NEEDED FOR MUSCLE SPASM 90 tablet 0    lamoTRIgine (LAMICTAL) 100 MG tablet Take 2 tablets by mouth nightly With 200mg tablet 60 tablet 0    atorvastatin (LIPITOR) 20 MG

## 2024-07-02 ENCOUNTER — HOSPITAL ENCOUNTER (OUTPATIENT)
Dept: MRI IMAGING | Age: 49
Discharge: HOME | End: 2024-07-04
Payer: MEDICARE

## 2024-07-02 DIAGNOSIS — M25.562 ACUTE PAIN OF LEFT KNEE: ICD-10-CM

## 2024-07-02 PROCEDURE — 73721 MRI JNT OF LWR EXTRE W/O DYE: CPT

## 2024-07-05 ENCOUNTER — TELEPHONE (OUTPATIENT)
Dept: FAMILY MEDICINE CLINIC | Age: 49
End: 2024-07-05

## 2024-07-05 DIAGNOSIS — M25.562 ACUTE PAIN OF LEFT KNEE: ICD-10-CM

## 2024-07-05 RX ORDER — TRAMADOL HYDROCHLORIDE 50 MG/1
50 TABLET ORAL 3 TIMES DAILY PRN
Qty: 12 TABLET | Refills: 0 | Status: SHIPPED | OUTPATIENT
Start: 2024-07-05 | End: 2024-07-10

## 2024-07-05 NOTE — TELEPHONE ENCOUNTER
Dr Marcos ordered MRI L Knee for pt and the pt is calling requesting phone call back with results.     Please advise

## 2024-07-12 ENCOUNTER — OFFICE VISIT (OUTPATIENT)
Dept: FAMILY MEDICINE CLINIC | Age: 49
End: 2024-07-12
Payer: MEDICARE

## 2024-07-12 VITALS
HEART RATE: 92 BPM | RESPIRATION RATE: 18 BRPM | HEIGHT: 70 IN | DIASTOLIC BLOOD PRESSURE: 75 MMHG | WEIGHT: 223.6 LBS | BODY MASS INDEX: 32.01 KG/M2 | SYSTOLIC BLOOD PRESSURE: 134 MMHG | OXYGEN SATURATION: 98 % | TEMPERATURE: 97.5 F

## 2024-07-12 DIAGNOSIS — M25.562 ACUTE PAIN OF LEFT KNEE: Primary | ICD-10-CM

## 2024-07-12 DIAGNOSIS — Z01.818 PREOPERATIVE CLEARANCE: ICD-10-CM

## 2024-07-12 PROCEDURE — G8417 CALC BMI ABV UP PARAM F/U: HCPCS | Performed by: FAMILY MEDICINE

## 2024-07-12 PROCEDURE — 3017F COLORECTAL CA SCREEN DOC REV: CPT | Performed by: FAMILY MEDICINE

## 2024-07-12 PROCEDURE — 4004F PT TOBACCO SCREEN RCVD TLK: CPT | Performed by: FAMILY MEDICINE

## 2024-07-12 PROCEDURE — 99214 OFFICE O/P EST MOD 30 MIN: CPT

## 2024-07-12 PROCEDURE — G8427 DOCREV CUR MEDS BY ELIG CLIN: HCPCS | Performed by: FAMILY MEDICINE

## 2024-07-12 RX ORDER — TRAMADOL HYDROCHLORIDE 50 MG/1
50 TABLET ORAL EVERY 6 HOURS PRN
Qty: 12 TABLET | Refills: 0 | Status: SHIPPED | OUTPATIENT
Start: 2024-07-12 | End: 2024-07-19

## 2024-07-12 RX ORDER — NAPROXEN 500 MG/1
500 TABLET ORAL 2 TIMES DAILY WITH MEALS
Qty: 60 TABLET | Refills: 0 | Status: SHIPPED | OUTPATIENT
Start: 2024-07-12 | End: 2024-08-11

## 2024-07-12 NOTE — PROGRESS NOTES
S: 48 y.o. female with   Chief Complaint   Patient presents with    Dental Problem     Dental form needs filled out if we have it.    Knee Pain     Reports pain is worse. Requests different knee brace and pain medication.       Pt was in the office because of left knee pain and she was referred to orthopedics.  She had an MRI.  She has an appt surgeon August 1st.  Swelling has improved.  She continues to have pain.     O: VS:  height is 1.778 m (5' 10\") and weight is 101.4 kg (223 lb 9.6 oz). Her temperature is 97.5 °F (36.4 °C). Her blood pressure is 134/75 and her pulse is 92. Her respiration is 18 and oxygen saturation is 98%.   BP Readings from Last 3 Encounters:   07/12/24 134/75   07/01/24 120/74   08/11/23 139/84     See resident note    Impression/Plan:   1) knee pain - naprosyn and ultram for pain control.  Pt had MRI.  Needs ortho.  Recommended ortho walk-in care.  2) tooth pain - pt having surgery upcoming.  Is low risk for a low risk procedure.       Health Maintenance Due   Topic Date Due    Hepatitis B vaccine (1 of 3 - 3-dose series) Never done    Diabetic foot exam  Never done    HIV screen  Never done    Diabetic Alb to Cr ratio (uACR) test  Never done    Hepatitis C screen  Never done    Cervical cancer screen  Never done    Diabetic retinal exam  08/06/2010    Pneumococcal 0-64 years Vaccine (2 of 2 - PCV) 09/12/2019    Colorectal Cancer Screen  07/26/2020    DTaP/Tdap/Td vaccine (1 - Tdap) 04/22/2022    Breast cancer screen  11/17/2022    Shingles vaccine (1 of 2) Never done    Lung Cancer Screening &/or Counseling  Never done    COVID-19 Vaccine (2 - 2023-24 season) 09/01/2023    Annual Wellness Visit (Medicare Advantage)  Never done    A1C test (Diabetic or Prediabetic)  08/10/2024    Lipids  08/10/2024    GFR test (Diabetes, CKD 3-4, OR last GFR 15-59)  08/10/2024         Attending Physician Statement  I have discussed the case, including pertinent history and exam findings with the resident. 
  Cardiovascular:  Negative for chest pain and palpitations.   Gastrointestinal:  Negative for abdominal pain, diarrhea, nausea and vomiting.   Musculoskeletal:  Positive for gait problem (due to pain on left knee) and joint swelling (Left knee pain and swelling).   Neurological:  Negative for weakness, numbness and headaches.   Psychiatric/Behavioral:  Negative for agitation, sleep disturbance and suicidal ideas.        Physical Exam   Vitals: /75   Pulse 92   Temp 97.5 °F (36.4 °C)   Resp 18   Ht 1.778 m (5' 10\")   Wt 101.4 kg (223 lb 9.6 oz)   LMP 07/26/2019   SpO2 98%   BMI 32.08 kg/m²   Physical Exam  Constitutional:       Appearance: She is obese. She is not toxic-appearing.   HENT:      Head: Normocephalic and atraumatic.      Nose: Nose normal.      Mouth/Throat:      Mouth: Mucous membranes are moist.      Pharynx: Oropharynx is clear.   Eyes:      Pupils: Pupils are equal, round, and reactive to light.   Cardiovascular:      Rate and Rhythm: Normal rate and regular rhythm.      Pulses: Normal pulses.      Heart sounds: Normal heart sounds.   Pulmonary:      Effort: Pulmonary effort is normal.      Breath sounds: Normal breath sounds.   Musculoskeletal:         General: Tenderness (left knee) present. No swelling.      Cervical back: Normal range of motion.      Comments: Left knee, no changes in temperature or skin changes, painful on palpation and painful with passive flexion and extension, unable to performed other maneuvers due to severe pain. Audible Clicking with flexion and extension.   Skin:     General: Skin is warm.      Capillary Refill: Capillary refill takes less than 2 seconds.   Neurological:      General: No focal deficit present.      Mental Status: She is alert and oriented to person, place, and time.      Gait: Gait abnormal (due to knee pain.).   Psychiatric:         Thought Content: Thought content normal.         Judgment: Judgment normal.             Assessment and Plan

## 2024-07-29 DIAGNOSIS — G47.9 DIFFICULTY SLEEPING: ICD-10-CM

## 2024-07-29 DIAGNOSIS — K21.9 GASTROESOPHAGEAL REFLUX DISEASE, UNSPECIFIED WHETHER ESOPHAGITIS PRESENT: ICD-10-CM

## 2024-07-29 RX ORDER — TRAZODONE HYDROCHLORIDE 50 MG/1
50 TABLET ORAL NIGHTLY
Qty: 30 TABLET | Refills: 0 | OUTPATIENT
Start: 2024-07-29

## 2024-07-29 RX ORDER — OMEPRAZOLE 20 MG/1
20 CAPSULE, DELAYED RELEASE ORAL DAILY
Qty: 30 CAPSULE | Refills: 2 | Status: SHIPPED | OUTPATIENT
Start: 2024-07-29

## 2024-08-15 ENCOUNTER — OFFICE VISIT (OUTPATIENT)
Dept: ORTHOPEDIC SURGERY | Age: 49
End: 2024-08-15

## 2024-08-15 DIAGNOSIS — M22.42 CHONDROMALACIA OF LEFT PATELLOFEMORAL JOINT: ICD-10-CM

## 2024-08-15 DIAGNOSIS — M17.12 PRIMARY OSTEOARTHRITIS OF LEFT KNEE: ICD-10-CM

## 2024-08-15 DIAGNOSIS — M25.562 LEFT KNEE PAIN, UNSPECIFIED CHRONICITY: Primary | ICD-10-CM

## 2024-08-15 RX ORDER — MELOXICAM 7.5 MG/1
7.5 TABLET ORAL DAILY
Qty: 30 TABLET | Refills: 0 | Status: SHIPPED | OUTPATIENT
Start: 2024-08-15

## 2024-08-15 RX ORDER — TRIAMCINOLONE ACETONIDE 40 MG/ML
80 INJECTION, SUSPENSION INTRA-ARTICULAR; INTRAMUSCULAR ONCE
Status: COMPLETED | OUTPATIENT
Start: 2024-08-15 | End: 2024-08-15

## 2024-08-15 RX ORDER — BUPIVACAINE HYDROCHLORIDE 2.5 MG/ML
3 INJECTION, SOLUTION INFILTRATION; PERINEURAL ONCE
Status: COMPLETED | OUTPATIENT
Start: 2024-08-15 | End: 2024-08-15

## 2024-08-15 RX ORDER — TRAZODONE HYDROCHLORIDE 50 MG/1
50 TABLET ORAL NIGHTLY
COMMUNITY

## 2024-08-15 RX ADMIN — TRIAMCINOLONE ACETONIDE 80 MG: 40 INJECTION, SUSPENSION INTRA-ARTICULAR; INTRAMUSCULAR at 14:38

## 2024-08-15 RX ADMIN — BUPIVACAINE HYDROCHLORIDE 7.5 MG: 2.5 INJECTION, SOLUTION INFILTRATION; PERINEURAL at 14:37

## 2024-08-15 NOTE — PATIENT INSTRUCTIONS
Discussed with patient that while taking Meloxicam (Mobic) that they should refrain from taking any other non-steroidal anti-inflammatories (NSAIDS). This could lead to gastric ulcers and renal impairments amongst other complications. They are understanding of this.

## 2024-08-15 NOTE — PROGRESS NOTES
New Knee Patient     Referring Provider:   Emperatriz Higgins MD  [address] [place] [address]    CHIEF COMPLAINT:   Chief Complaint   Patient presents with    Knee Pain     New patient with left         HPI:    Destiny Del Valle is a 49 y.o. year old female who is seen today  for evaluation of left knee pain. She states this has been ongoing for a few months. She does not recall any specific injury. She states she was standing up from the toilet when she knee \"popped' and causing significant pain. She has tried Voltaren gel and oral, naproxen, ibuprofen, gabapentin and lidoderm patches that only provide her mild temporary relief. She states she feels her knee lock but does not feel unstable. She is non-weightbearing to the right ankle due to fusion which requires her to put full weight on the left leg. She ambulates with a cane. She denies numbness and tingling.       PAST MEDICAL HISTORY  Past Medical History:   Diagnosis Date    Ankle fracture, right 2013    hardware insertion; fracture after a fall from 40 feet     Bipolar affective disorder (HCC)     [facility] in [place]     Cervical radiculopathy     De Quervain's tenosynovitis, left     Diabetic neuropathy (HCC)     Displacement of cervical intervertebral disc without myelopathy     Displacement of lumbar intervertebral disc without myelopathy     Headache(784.0)     Liver cyst     Long term (current) use of opiate analgesic     Pain Contract with [facility]    Lumbar radiculopathy     Plantar fasciitis     Dr Garrett     Pulmonary nodules     Cleared by Dr Valderrama; stable nodules    Thyroid cyst     biopsied in 2011, but report was unsatisfactory     Type II or unspecified type diabetes mellitus without mention of complication, not stated as uncontrolled        PAST SURGICAL HISTORY  Past Surgical History:   Procedure Laterality Date    ANKLE FRACTURE SURGERY Right 8/2013    [facility]     BACK SURGERY  10/16/2017    [facility]

## 2024-09-18 RX ORDER — MELOXICAM 7.5 MG/1
7.5 TABLET ORAL DAILY
Qty: 30 TABLET | Refills: 0 | Status: SHIPPED | OUTPATIENT
Start: 2024-09-18

## 2024-09-28 DIAGNOSIS — M48.061 SPINAL STENOSIS, LUMBAR REGION, WITHOUT NEUROGENIC CLAUDICATION: ICD-10-CM

## 2024-09-28 DIAGNOSIS — Z79.899 MEDICATION MANAGEMENT: ICD-10-CM

## 2024-09-28 DIAGNOSIS — M54.16 RADICULOPATHY, LUMBAR REGION: ICD-10-CM

## 2024-09-30 RX ORDER — GABAPENTIN 800 MG/1
800 TABLET ORAL 3 TIMES DAILY
Qty: 90 TABLET | Refills: 2 | OUTPATIENT
Start: 2024-09-30

## 2024-09-30 RX ORDER — MELOXICAM 7.5 MG/1
7.5 TABLET ORAL DAILY
Qty: 30 TABLET | Refills: 0 | OUTPATIENT
Start: 2024-09-30

## 2024-09-30 NOTE — TELEPHONE ENCOUNTER
It would be inappropriate for me to refill this medication since I personally have not seen her in over a year and a half.

## 2024-09-30 NOTE — TELEPHONE ENCOUNTER
Last Appointment:  7/12/2024  Future Appointments   Date Time Provider Department Center   11/14/2024  1:40 PM Yazan Connell DO [facility]

## 2024-10-01 DIAGNOSIS — K21.9 GASTROESOPHAGEAL REFLUX DISEASE, UNSPECIFIED WHETHER ESOPHAGITIS PRESENT: ICD-10-CM

## 2024-10-01 NOTE — TELEPHONE ENCOUNTER
I have not seen the patient in almost 18 months.  I do not think it is appropriate for me to refill a medication.

## 2024-10-15 ENCOUNTER — TELEPHONE (OUTPATIENT)
Dept: ORTHOPEDIC SURGERY | Age: 49
End: 2024-10-15

## 2024-10-15 NOTE — TELEPHONE ENCOUNTER
Patient called LM on VM, [place] will be [place] of [place] from 11[place]1/2024 - 1/5/2025.  Not due for repeat cortisone injection Lt knee until after 11/15/2024.  Would you consider giving injection early prior to her leaving country?    Mobic caused her terrible side effects.  Any other options for knee pain.

## 2024-10-16 NOTE — TELEPHONE ENCOUNTER
Spoke with patient regarding injection sooner.  Appt scheduled for 10/24/2024  1:50  p.m Yajaira.  Instructed patient to call PCP with regards to meds for OA due to side effects from Mobic.

## 2024-10-24 ENCOUNTER — OFFICE VISIT (OUTPATIENT)
Dept: ORTHOPEDIC SURGERY | Age: 49
End: 2024-10-24

## 2024-10-24 DIAGNOSIS — M17.12 PRIMARY OSTEOARTHRITIS OF LEFT KNEE: ICD-10-CM

## 2024-10-24 DIAGNOSIS — M25.562 LEFT KNEE PAIN, UNSPECIFIED CHRONICITY: Primary | ICD-10-CM

## 2024-10-24 RX ORDER — TIZANIDINE 2 MG/1
4 TABLET ORAL 3 TIMES DAILY PRN
Qty: 30 TABLET | Refills: 0 | Status: SHIPPED | OUTPATIENT
Start: 2024-10-24

## 2024-10-24 RX ORDER — BUPIVACAINE HYDROCHLORIDE 2.5 MG/ML
3 INJECTION, SOLUTION INFILTRATION; PERINEURAL ONCE
Status: COMPLETED | OUTPATIENT
Start: 2024-10-24 | End: 2024-10-24

## 2024-10-24 RX ORDER — TRIAMCINOLONE ACETONIDE 40 MG/ML
80 INJECTION, SUSPENSION INTRA-ARTICULAR; INTRAMUSCULAR ONCE
Status: COMPLETED | OUTPATIENT
Start: 2024-10-24 | End: 2024-10-24

## 2024-10-24 RX ADMIN — BUPIVACAINE HYDROCHLORIDE 7.5 MG: 2.5 INJECTION, SOLUTION INFILTRATION; PERINEURAL at 14:17

## 2024-10-24 RX ADMIN — TRIAMCINOLONE ACETONIDE 80 MG: 40 INJECTION, SUSPENSION INTRA-ARTICULAR; INTRAMUSCULAR at 14:18

## 2024-10-24 NOTE — PROGRESS NOTES
for 6 to 8 weeks of moderate relief with steroid injections  Reminded patient that injections can be done every 3 months  Advised on signs and symptoms of infection to monitor for  Refilled tizanidine for muscle spasms and pain to left knee      Follow up as needed for repeat injections vs surgical management. All questions answered.      NGHIA Peres - CNP  Orthopaedic Surgery   10/24/24   1:48 PM EDT

## 2025-01-15 DIAGNOSIS — M25.562 LEFT KNEE PAIN, UNSPECIFIED CHRONICITY: ICD-10-CM

## 2025-01-15 DIAGNOSIS — M54.16 RADICULOPATHY, LUMBAR REGION: ICD-10-CM

## 2025-01-15 DIAGNOSIS — M48.061 SPINAL STENOSIS, LUMBAR REGION, WITHOUT NEUROGENIC CLAUDICATION: ICD-10-CM

## 2025-01-15 DIAGNOSIS — K21.9 GASTROESOPHAGEAL REFLUX DISEASE, UNSPECIFIED WHETHER ESOPHAGITIS PRESENT: ICD-10-CM

## 2025-01-15 DIAGNOSIS — Z79.899 MEDICATION MANAGEMENT: ICD-10-CM

## 2025-01-15 RX ORDER — MELOXICAM 7.5 MG/1
7.5 TABLET ORAL DAILY
Qty: 30 TABLET | Refills: 0 | Status: CANCELLED | OUTPATIENT
Start: 2025-01-15

## 2025-01-15 RX ORDER — TIZANIDINE 2 MG/1
4 TABLET ORAL 3 TIMES DAILY PRN
Qty: 30 TABLET | Refills: 0 | Status: SHIPPED
Start: 2025-01-15 | End: 2025-01-15 | Stop reason: SDUPTHER

## 2025-01-15 NOTE — TELEPHONE ENCOUNTER
I would have her follow up with her PCP if she needs a refill on the Mobic. She will need to have her kidney function monitored and we do not give the medication long term.

## 2025-01-15 NOTE — TELEPHONE ENCOUNTER
Name of Medication(s) Requested:  Requested Prescriptions     Pending Prescriptions Disp Refills    tiZANidine (ZANAFLEX) 4 MG tablet 90 tablet 0     Sig: TAKE 1 TABLET BY MOUTH EVERY 8 HOURS AS NEEDED FOR MUSCLE SPASM       Medication is on current medication list Yes    Dosage and directions were verified? Yes    Quantity verified: 90 day supply     Pharmacy Verified?  Yes    Last Appointment:  8/11/2023    Future appts:  No future appointments.     (If no appt send self scheduling link. .REFILLAPPT)  Scheduling request sent?     [] Yes  [x] No    Does patient need updated?  [x] Yes  [] No

## 2025-01-15 NOTE — TELEPHONE ENCOUNTER
Appointment needed - has not seen Dr Ackerman in 2 years. Has been seen for acute issues with residents, but no visit since July 2024

## 2025-01-20 RX ORDER — GABAPENTIN 800 MG/1
800 TABLET ORAL 3 TIMES DAILY
Qty: 90 TABLET | Refills: 2 | OUTPATIENT
Start: 2025-01-20 | End: 2025-04-20

## 2025-01-20 RX ORDER — OMEPRAZOLE 20 MG/1
20 CAPSULE, DELAYED RELEASE ORAL DAILY
Qty: 30 CAPSULE | Refills: 2 | OUTPATIENT
Start: 2025-01-20

## 2025-05-21 DIAGNOSIS — M54.16 RADICULOPATHY, LUMBAR REGION: ICD-10-CM

## 2025-05-21 DIAGNOSIS — J30.2 SEASONAL ALLERGIES: ICD-10-CM

## 2025-05-21 DIAGNOSIS — Z79.899 MEDICATION MANAGEMENT: ICD-10-CM

## 2025-05-21 DIAGNOSIS — M25.512 ACUTE PAIN OF LEFT SHOULDER: ICD-10-CM

## 2025-05-21 DIAGNOSIS — M48.061 SPINAL STENOSIS, LUMBAR REGION, WITHOUT NEUROGENIC CLAUDICATION: ICD-10-CM

## 2025-05-21 DIAGNOSIS — K21.9 GASTROESOPHAGEAL REFLUX DISEASE, UNSPECIFIED WHETHER ESOPHAGITIS PRESENT: ICD-10-CM

## 2025-05-21 DIAGNOSIS — M25.562 ACUTE PAIN OF LEFT KNEE: ICD-10-CM

## 2025-05-21 RX ORDER — CETIRIZINE HYDROCHLORIDE 10 MG/1
10 TABLET ORAL DAILY
Qty: 30 TABLET | Refills: 0 | Status: SHIPPED | OUTPATIENT
Start: 2025-05-21

## 2025-05-21 RX ORDER — DICLOFENAC SODIUM 75 MG/1
75 TABLET, DELAYED RELEASE ORAL 2 TIMES DAILY PRN
Qty: 60 TABLET | Refills: 0 | Status: SHIPPED | OUTPATIENT
Start: 2025-05-21

## 2025-05-21 RX ORDER — OMEPRAZOLE 20 MG/1
20 CAPSULE, DELAYED RELEASE ORAL DAILY
Qty: 30 CAPSULE | Refills: 0 | Status: SHIPPED | OUTPATIENT
Start: 2025-05-21

## 2025-05-21 RX ORDER — GABAPENTIN 800 MG/1
800 TABLET ORAL 3 TIMES DAILY
Qty: 90 TABLET | Refills: 0 | Status: SHIPPED | OUTPATIENT
Start: 2025-05-21 | End: 2025-06-20

## 2025-05-21 RX ORDER — MELOXICAM 7.5 MG/1
7.5 TABLET ORAL DAILY
Qty: 30 TABLET | Refills: 0 | Status: SHIPPED | OUTPATIENT
Start: 2025-05-21

## 2025-05-21 ASSESSMENT — PATIENT HEALTH QUESTIONNAIRE - PHQ9
SUM OF ALL RESPONSES TO PHQ QUESTIONS 1-9: 7
6. FEELING BAD ABOUT YOURSELF - OR THAT YOU ARE A FAILURE OR HAVE LET YOURSELF OR YOUR FAMILY DOWN: SEVERAL DAYS
10. IF YOU CHECKED OFF ANY PROBLEMS, HOW DIFFICULT HAVE THESE PROBLEMS MADE IT FOR YOU TO DO YOUR WORK, TAKE CARE OF THINGS AT HOME, OR GET ALONG WITH OTHER PEOPLE: VERY DIFFICULT
5. POOR APPETITE OR OVEREATING: NOT AT ALL
9. THOUGHTS THAT YOU WOULD BE BETTER OFF DEAD, OR OF HURTING YOURSELF: NOT AT ALL
8. MOVING OR SPEAKING SO SLOWLY THAT OTHER PEOPLE COULD HAVE NOTICED. OR THE OPPOSITE - BEING SO FIDGETY OR RESTLESS THAT YOU HAVE BEEN MOVING AROUND A LOT MORE THAN USUAL: NOT AT ALL
8. MOVING OR SPEAKING SO SLOWLY THAT OTHER PEOPLE COULD HAVE NOTICED. OR THE OPPOSITE, BEING SO FIGETY OR RESTLESS THAT YOU HAVE BEEN MOVING AROUND A LOT MORE THAN USUAL: NOT AT ALL
7. TROUBLE CONCENTRATING ON THINGS, SUCH AS READING THE NEWSPAPER OR WATCHING TELEVISION: NOT AT ALL
1. LITTLE INTEREST OR PLEASURE IN DOING THINGS: SEVERAL DAYS
SUM OF ALL RESPONSES TO PHQ QUESTIONS 1-9: 7
2. FEELING DOWN, DEPRESSED OR HOPELESS: SEVERAL DAYS
SUM OF ALL RESPONSES TO PHQ QUESTIONS 1-9: 7
9. THOUGHTS THAT YOU WOULD BE BETTER OFF DEAD, OR OF HURTING YOURSELF: NOT AT ALL
7. TROUBLE CONCENTRATING ON THINGS, SUCH AS READING THE NEWSPAPER OR WATCHING TELEVISION: NOT AT ALL
2. FEELING DOWN, DEPRESSED OR HOPELESS: SEVERAL DAYS
SUM OF ALL RESPONSES TO PHQ QUESTIONS 1-9: 7
6. FEELING BAD ABOUT YOURSELF - OR THAT YOU ARE A FAILURE OR HAVE LET YOURSELF OR YOUR FAMILY DOWN: SEVERAL DAYS
SUM OF ALL RESPONSES TO PHQ QUESTIONS 1-9: 7
4. FEELING TIRED OR HAVING LITTLE ENERGY: MORE THAN HALF THE DAYS
3. TROUBLE FALLING OR STAYING ASLEEP: MORE THAN HALF THE DAYS
5. POOR APPETITE OR OVEREATING: NOT AT ALL
4. FEELING TIRED OR HAVING LITTLE ENERGY: MORE THAN HALF THE DAYS
10. IF YOU CHECKED OFF ANY PROBLEMS, HOW DIFFICULT HAVE THESE PROBLEMS MADE IT FOR YOU TO DO YOUR WORK, TAKE CARE OF THINGS AT HOME, OR GET ALONG WITH OTHER PEOPLE: VERY DIFFICULT
3. TROUBLE FALLING OR STAYING ASLEEP: MORE THAN HALF THE DAYS
1. LITTLE INTEREST OR PLEASURE IN DOING THINGS: SEVERAL DAYS

## 2025-05-21 NOTE — TELEPHONE ENCOUNTER
*Patient states she is out of medications*    Name of Medication(s) Requested:  Requested Prescriptions     Pending Prescriptions Disp Refills    omeprazole (PRILOSEC) 20 MG delayed release capsule 30 capsule 2     Sig: Take 1 capsule by mouth daily    gabapentin (NEURONTIN) 800 MG tablet 90 tablet 2     Sig: Take 1 tablet by mouth 3 times daily for 90 days.    cetirizine (ZYRTEC) 10 MG tablet 30 tablet 0     Sig: Take 1 tablet by mouth daily    diclofenac (VOLTAREN) 75 MG EC tablet 60 tablet 3     Sig: Take 1 tablet by mouth 2 times daily as needed for Pain    diclofenac sodium (VOLTAREN) 1 %  g 0     Sig: Apply 2 g topically 4 times daily    tiZANidine (ZANAFLEX) 4 MG tablet 90 tablet 0     Sig: TAKE 1 TABLET BY MOUTH EVERY 8 HOURS AS NEEDED FOR MUSCLE SPASM       Medication is on current medication list Yes    Dosage and directions were verified? Yes    Quantity verified: 90 day supply     Pharmacy Verified?  Yes    Last Appointment:  7/12/2024    Future appts:  Future Appointments   Date Time Provider Department Center   5/28/2025  3:40 PM Sara Rodriguez MD [facility]        (If no appt send self scheduling link. .REFILLAPPT)  Scheduling request sent?     [] Yes  [x] No    Does patient need updated?  [] Yes  [x] No

## 2025-05-21 NOTE — TELEPHONE ENCOUNTER
I will provide her with 1 more refill of her meloxicam.  All further refills will need to be provided from her primary care physician.

## 2025-05-22 RX ORDER — DICLOFENAC SODIUM 75 MG/1
75 TABLET, DELAYED RELEASE ORAL 2 TIMES DAILY PRN
Qty: 60 TABLET | Refills: 3 | OUTPATIENT
Start: 2025-05-22

## 2025-05-22 RX ORDER — GABAPENTIN 800 MG/1
800 TABLET ORAL 3 TIMES DAILY
Qty: 90 TABLET | Refills: 2 | OUTPATIENT
Start: 2025-05-22 | End: 2025-08-20

## 2025-05-22 RX ORDER — CETIRIZINE HYDROCHLORIDE 10 MG/1
10 TABLET ORAL DAILY
Qty: 30 TABLET | Refills: 0 | OUTPATIENT
Start: 2025-05-22

## 2025-05-28 ENCOUNTER — OFFICE VISIT (OUTPATIENT)
Dept: FAMILY MEDICINE CLINIC | Age: 50
End: 2025-05-28

## 2025-05-28 VITALS
BODY MASS INDEX: 32.93 KG/M2 | WEIGHT: 230 LBS | TEMPERATURE: 98 F | OXYGEN SATURATION: 97 % | HEIGHT: 70 IN | SYSTOLIC BLOOD PRESSURE: 126 MMHG | HEART RATE: 84 BPM | DIASTOLIC BLOOD PRESSURE: 84 MMHG | RESPIRATION RATE: 16 BRPM

## 2025-05-28 DIAGNOSIS — Z12.31 ENCOUNTER FOR SCREENING MAMMOGRAM FOR MALIGNANT NEOPLASM OF BREAST: ICD-10-CM

## 2025-05-28 DIAGNOSIS — E11.9 CONTROLLED TYPE 2 DIABETES MELLITUS WITHOUT COMPLICATION, WITHOUT LONG-TERM CURRENT USE OF INSULIN (HCC): ICD-10-CM

## 2025-05-28 DIAGNOSIS — Z11.59 NEED FOR HEPATITIS C SCREENING TEST: ICD-10-CM

## 2025-05-28 DIAGNOSIS — Z12.11 ENCOUNTER FOR COLORECTAL CANCER SCREENING: ICD-10-CM

## 2025-05-28 DIAGNOSIS — Z87.891 PERSONAL HISTORY OF TOBACCO USE: ICD-10-CM

## 2025-05-28 DIAGNOSIS — E78.2 MIXED HYPERLIPIDEMIA: ICD-10-CM

## 2025-05-28 DIAGNOSIS — Z11.4 ENCOUNTER FOR SCREENING FOR HIV: ICD-10-CM

## 2025-05-28 DIAGNOSIS — Z12.12 ENCOUNTER FOR COLORECTAL CANCER SCREENING: ICD-10-CM

## 2025-05-28 DIAGNOSIS — M48.061 SPINAL STENOSIS, LUMBAR REGION, WITHOUT NEUROGENIC CLAUDICATION: ICD-10-CM

## 2025-05-28 DIAGNOSIS — F31.0 BIPOLAR AFFECTIVE DISORDER, CURRENT EPISODE HYPOMANIC (HCC): ICD-10-CM

## 2025-05-28 DIAGNOSIS — M79.10 MYALGIA: Primary | ICD-10-CM

## 2025-05-28 DIAGNOSIS — R93.7 ABNORMAL CT SCAN, CERVICAL SPINE: ICD-10-CM

## 2025-05-28 RX ORDER — AMITRIPTYLINE HYDROCHLORIDE 10 MG/1
10 TABLET ORAL NIGHTLY
Qty: 30 TABLET | Refills: 0 | Status: SHIPPED | OUTPATIENT
Start: 2025-05-28

## 2025-05-28 NOTE — PROGRESS NOTES
[facility]  Precepting Note    Subjective:  Follow up   Reports multiple joint pain including hip, back, knee, hand pain   Hx of lumbar fusion   Difficulty getting out of bed  Follows with Ortho      ROS otherwise negative     Past medical, surgical, family and social history were reviewed, non-contributory, and unchanged unless otherwise stated.    Objective:    /84   Pulse 84   Temp 98 °F (36.7 °C) (Temporal)   Resp 16   Ht 1.778 m (5' 10\")   Wt 104.3 kg (230 lb)   LMP 07/26/2019   SpO2 97%   BMI 33.00 kg/m²     Exam is as noted by resident with the following changes, additions or corrections:      General:  NAD; alert & oriented x 3   Heart:  RRR, no murmurs, gallops, or rubs.  Lungs:  CTA bilaterally, no wheeze, rales or rhonchi  Multiple areas of point tenderness    Assessment/Plan:  Chronic pain   Myaglias  - check ESR, CRP, TSH  Spinal stenosis- MRIs reordered  HLD- repeat lipid  DMII- repeat HbA1c, microalbumin  HM- mammogram, HIV, Hep C screen   Follow up 2 weeks      Attending Physician Statement  I have reviewed the chart, including any radiology or labs, and have seen the patient with the resident(s).  I personally reviewed and performed key elements of the history and exam.  I agree with the assessment, plan and orders as documented by the resident.  Please refer to the resident note for additional information.      Electronically signed by Leigh Randle MD on 5/28/2025 at 4:17 PM  
11/17/2022    Shingles vaccine (1 of 2) Never done    Lung Cancer Screening &/or Counseling  08/09/2023    A1C test (Diabetic or Prediabetic)  08/10/2024    Lipids  08/10/2024    GFR test (Diabetes, CKD 3-4, OR last GFR 15-59)  08/10/2024    COVID-19 Vaccine (2 - 2024-25 season) 09/01/2024    Annual Wellness Visit (Medicare Advantage)  Never done       Physical Exam:    Vitals: /84   Pulse 84   Temp 98 °F (36.7 °C) (Temporal)   Resp 16   Ht 1.778 m (5' 10\")   Wt 104.3 kg (230 lb)   LMP 07/26/2019   SpO2 97%   BMI 33.00 kg/m²   BP Readings from Last 3 Encounters:   05/28/25 126/84   07/12/24 134/75   07/01/24 120/74     General Appearance: Well developed, awake, alert, oriented, no acute distress  HEENT: Normocephalic,atraumatic. PERRL, EOM's intact, no pallor or icterus.   Neck: Supple, symmetrical. No JVD.  Chest wall/Lung: Clear to auscultation  bilaterally,  respirations unlabored. No ronchi/wheezing/rales  Heart::  Regular rate and rhythm, S1and S2 normal, no murmur, rub or gallop.  Abdomen: Soft, non-tender, bowel sounds normoactive, no masses, no organomegaly  Extremities:  Extremities normal, atraumatic, no cyanosis, edema.trigger point tenderness paraspinal and shoulder area knee brace on left knee  Skin: Skin color, texture, turgor normal, no rashes or lesions  Musculokeletal: ROM grossly normal  Lymph nodes: no lymph node enlargement appreciated  Neurologic:   Alert&Oriented.    Normal gait and coordination  No focal neurological deficits appreaciated         Psychiatric: has a normal mood and affect. Behavior is normal.       Assessment and Plan:         1. Controlled type 2 diabetes mellitus without complication, without long-term current use of insulin (HCC)  Labs ordered.  Will discuss management based on results  - CBC with Auto Differential  - Comprehensive Metabolic Panel  - Hemoglobin A1C  - Albumin/Creatinine Ratio, Urine; Future    2. Bipolar affective disorder, current episode

## 2025-08-06 DIAGNOSIS — J30.2 SEASONAL ALLERGIES: ICD-10-CM

## 2025-08-06 DIAGNOSIS — M25.562 ACUTE PAIN OF LEFT KNEE: ICD-10-CM

## 2025-08-06 DIAGNOSIS — Z79.899 MEDICATION MANAGEMENT: ICD-10-CM

## 2025-08-06 DIAGNOSIS — K21.9 GASTROESOPHAGEAL REFLUX DISEASE, UNSPECIFIED WHETHER ESOPHAGITIS PRESENT: ICD-10-CM

## 2025-08-06 DIAGNOSIS — M54.16 RADICULOPATHY, LUMBAR REGION: ICD-10-CM

## 2025-08-06 DIAGNOSIS — M25.512 ACUTE PAIN OF LEFT SHOULDER: ICD-10-CM

## 2025-08-06 DIAGNOSIS — M48.061 SPINAL STENOSIS, LUMBAR REGION, WITHOUT NEUROGENIC CLAUDICATION: ICD-10-CM

## 2025-08-06 DIAGNOSIS — M79.10 MYALGIA: ICD-10-CM

## 2025-08-07 RX ORDER — OMEPRAZOLE 20 MG/1
20 CAPSULE, DELAYED RELEASE ORAL DAILY
Qty: 30 CAPSULE | Refills: 0 | OUTPATIENT
Start: 2025-08-07

## 2025-08-07 RX ORDER — GABAPENTIN 800 MG/1
800 TABLET ORAL 3 TIMES DAILY
Qty: 90 TABLET | Refills: 0 | OUTPATIENT
Start: 2025-08-07 | End: 2025-09-06

## 2025-08-07 RX ORDER — DICLOFENAC SODIUM 75 MG/1
75 TABLET, DELAYED RELEASE ORAL 2 TIMES DAILY PRN
Qty: 60 TABLET | Refills: 0 | OUTPATIENT
Start: 2025-08-07

## 2025-08-07 RX ORDER — CETIRIZINE HYDROCHLORIDE 10 MG/1
10 TABLET ORAL DAILY
Qty: 30 TABLET | Refills: 0 | OUTPATIENT
Start: 2025-08-07

## 2025-08-07 RX ORDER — LAMOTRIGINE 100 MG/1
200 TABLET ORAL NIGHTLY
Qty: 60 TABLET | Refills: 0 | OUTPATIENT
Start: 2025-08-07

## 2025-08-08 DIAGNOSIS — Z79.899 MEDICATION MANAGEMENT: ICD-10-CM

## 2025-08-08 DIAGNOSIS — K21.9 GASTROESOPHAGEAL REFLUX DISEASE, UNSPECIFIED WHETHER ESOPHAGITIS PRESENT: ICD-10-CM

## 2025-08-08 DIAGNOSIS — M48.061 SPINAL STENOSIS, LUMBAR REGION, WITHOUT NEUROGENIC CLAUDICATION: ICD-10-CM

## 2025-08-08 DIAGNOSIS — M25.512 ACUTE PAIN OF LEFT SHOULDER: ICD-10-CM

## 2025-08-08 DIAGNOSIS — M25.562 ACUTE PAIN OF LEFT KNEE: ICD-10-CM

## 2025-08-08 DIAGNOSIS — M54.16 RADICULOPATHY, LUMBAR REGION: ICD-10-CM

## 2025-08-08 RX ORDER — DICLOFENAC SODIUM 75 MG/1
75 TABLET, DELAYED RELEASE ORAL 2 TIMES DAILY PRN
Qty: 60 TABLET | Refills: 0 | Status: SHIPPED | OUTPATIENT
Start: 2025-08-08

## 2025-08-08 RX ORDER — OMEPRAZOLE 20 MG/1
20 CAPSULE, DELAYED RELEASE ORAL DAILY
Qty: 30 CAPSULE | Refills: 0 | Status: SHIPPED | OUTPATIENT
Start: 2025-08-08

## 2025-08-08 RX ORDER — AMITRIPTYLINE HYDROCHLORIDE 10 MG/1
10 TABLET ORAL NIGHTLY
Qty: 30 TABLET | Refills: 0 | Status: SHIPPED | OUTPATIENT
Start: 2025-08-08

## 2025-08-08 RX ORDER — GABAPENTIN 800 MG/1
800 TABLET ORAL 3 TIMES DAILY
Qty: 90 TABLET | Refills: 0 | Status: SHIPPED | OUTPATIENT
Start: 2025-08-08 | End: 2025-09-07